# Patient Record
Sex: MALE | Race: WHITE | NOT HISPANIC OR LATINO | URBAN - METROPOLITAN AREA
[De-identification: names, ages, dates, MRNs, and addresses within clinical notes are randomized per-mention and may not be internally consistent; named-entity substitution may affect disease eponyms.]

---

## 2023-03-06 ENCOUNTER — HOSPITAL ENCOUNTER (INPATIENT)
Facility: MEDICAL CENTER | Age: 68
LOS: 2 days | DRG: 247 | End: 2023-03-08
Attending: EMERGENCY MEDICINE | Admitting: EMERGENCY MEDICINE
Payer: MEDICARE

## 2023-03-06 ENCOUNTER — APPOINTMENT (OUTPATIENT)
Dept: CARDIOLOGY | Facility: MEDICAL CENTER | Age: 68
DRG: 247 | End: 2023-03-06
Attending: EMERGENCY MEDICINE
Payer: MEDICARE

## 2023-03-06 ENCOUNTER — APPOINTMENT (OUTPATIENT)
Dept: RADIOLOGY | Facility: MEDICAL CENTER | Age: 68
DRG: 247 | End: 2023-03-06
Attending: EMERGENCY MEDICINE
Payer: MEDICARE

## 2023-03-06 ENCOUNTER — APPOINTMENT (OUTPATIENT)
Dept: RADIOLOGY | Facility: MEDICAL CENTER | Age: 68
DRG: 247 | End: 2023-03-06
Payer: MEDICARE

## 2023-03-06 ENCOUNTER — APPOINTMENT (OUTPATIENT)
Dept: CARDIOLOGY | Facility: MEDICAL CENTER | Age: 68
DRG: 247 | End: 2023-03-06
Attending: INTERNAL MEDICINE
Payer: MEDICARE

## 2023-03-06 DIAGNOSIS — E03.9 HYPOTHYROIDISM, UNSPECIFIED TYPE: ICD-10-CM

## 2023-03-06 DIAGNOSIS — I25.83 CORONARY ARTERY DISEASE DUE TO LIPID RICH PLAQUE: Chronic | ICD-10-CM

## 2023-03-06 DIAGNOSIS — I25.10 CORONARY ARTERY DISEASE DUE TO LIPID RICH PLAQUE: Chronic | ICD-10-CM

## 2023-03-06 DIAGNOSIS — I21.3 ST ELEVATION MYOCARDIAL INFARCTION (STEMI), UNSPECIFIED ARTERY (HCC): ICD-10-CM

## 2023-03-06 PROBLEM — M54.16 LUMBAR RADICULOPATHY: Status: ACTIVE | Noted: 2018-01-18

## 2023-03-06 PROBLEM — I47.20 V-TACH (HCC): Status: ACTIVE | Noted: 2023-03-05

## 2023-03-06 PROBLEM — M17.0 BILATERAL PRIMARY OSTEOARTHRITIS OF KNEE: Status: ACTIVE | Noted: 2018-02-13

## 2023-03-06 PROBLEM — G47.33 OBSTRUCTIVE SLEEP APNEA SYNDROME IN ADULT: Status: ACTIVE | Noted: 2018-09-06

## 2023-03-06 PROBLEM — E78.5 HYPERLIPIDEMIA: Status: ACTIVE | Noted: 2023-01-24

## 2023-03-06 PROBLEM — E87.29 HIGH ANION GAP METABOLIC ACIDOSIS: Status: ACTIVE | Noted: 2023-03-06

## 2023-03-06 PROBLEM — R97.20 ELEVATED PSA: Status: ACTIVE | Noted: 2021-10-06

## 2023-03-06 PROBLEM — I70.0 ATHEROSCLEROSIS OF AORTA (HCC): Status: ACTIVE | Noted: 2019-06-21

## 2023-03-06 LAB
ALBUMIN SERPL BCP-MCNC: 3.9 G/DL (ref 3.2–4.9)
ALBUMIN SERPL BCP-MCNC: 4.5 G/DL (ref 3.2–4.9)
ALBUMIN/GLOB SERPL: 1.6 G/DL
ALBUMIN/GLOB SERPL: 1.9 G/DL
ALP SERPL-CCNC: 59 U/L (ref 30–99)
ALP SERPL-CCNC: 69 U/L (ref 30–99)
ALT SERPL-CCNC: 41 U/L (ref 2–50)
ALT SERPL-CCNC: 69 U/L (ref 2–50)
ANION GAP SERPL CALC-SCNC: 12 MMOL/L (ref 7–16)
ANION GAP SERPL CALC-SCNC: 16 MMOL/L (ref 7–16)
ANION GAP SERPL CALC-SCNC: 16 MMOL/L (ref 7–16)
ANION GAP SERPL CALC-SCNC: 9 MMOL/L (ref 7–16)
APTT PPP: 53.7 SEC (ref 24.7–36)
AST SERPL-CCNC: 324 U/L (ref 12–45)
AST SERPL-CCNC: 79 U/L (ref 12–45)
B-OH-BUTYR SERPL-MCNC: 0.68 MMOL/L (ref 0.02–0.27)
BASOPHILS # BLD AUTO: 0.3 % (ref 0–1.8)
BASOPHILS # BLD: 0.05 K/UL (ref 0–0.12)
BILIRUB SERPL-MCNC: 1.5 MG/DL (ref 0.1–1.5)
BILIRUB SERPL-MCNC: 1.5 MG/DL (ref 0.1–1.5)
BUN SERPL-MCNC: 15 MG/DL (ref 8–22)
BUN SERPL-MCNC: 16 MG/DL (ref 8–22)
BUN SERPL-MCNC: 16 MG/DL (ref 8–22)
BUN SERPL-MCNC: 17 MG/DL (ref 8–22)
CALCIUM ALBUM COR SERPL-MCNC: 8.9 MG/DL (ref 8.5–10.5)
CALCIUM ALBUM COR SERPL-MCNC: 9 MG/DL (ref 8.5–10.5)
CALCIUM SERPL-MCNC: 8.9 MG/DL (ref 8.5–10.5)
CALCIUM SERPL-MCNC: 8.9 MG/DL (ref 8.5–10.5)
CALCIUM SERPL-MCNC: 9.1 MG/DL (ref 8.5–10.5)
CALCIUM SERPL-MCNC: 9.3 MG/DL (ref 8.5–10.5)
CHLORIDE SERPL-SCNC: 100 MMOL/L (ref 96–112)
CHLORIDE SERPL-SCNC: 102 MMOL/L (ref 96–112)
CHLORIDE SERPL-SCNC: 103 MMOL/L (ref 96–112)
CHLORIDE SERPL-SCNC: 104 MMOL/L (ref 96–112)
CO2 SERPL-SCNC: 16 MMOL/L (ref 20–33)
CO2 SERPL-SCNC: 16 MMOL/L (ref 20–33)
CO2 SERPL-SCNC: 19 MMOL/L (ref 20–33)
CO2 SERPL-SCNC: 22 MMOL/L (ref 20–33)
CREAT SERPL-MCNC: 0.58 MG/DL (ref 0.5–1.4)
CREAT SERPL-MCNC: 0.64 MG/DL (ref 0.5–1.4)
CREAT SERPL-MCNC: 0.65 MG/DL (ref 0.5–1.4)
CREAT SERPL-MCNC: 0.75 MG/DL (ref 0.5–1.4)
EKG IMPRESSION: NORMAL
EOSINOPHIL # BLD AUTO: 0 K/UL (ref 0–0.51)
EOSINOPHIL NFR BLD: 0 % (ref 0–6.9)
ERYTHROCYTE [DISTWIDTH] IN BLOOD BY AUTOMATED COUNT: 41.9 FL (ref 35.9–50)
EST. AVERAGE GLUCOSE BLD GHB EST-MCNC: 111 MG/DL
GFR SERPLBLD CREATININE-BSD FMLA CKD-EPI: 102 ML/MIN/1.73 M 2
GFR SERPLBLD CREATININE-BSD FMLA CKD-EPI: 103 ML/MIN/1.73 M 2
GFR SERPLBLD CREATININE-BSD FMLA CKD-EPI: 106 ML/MIN/1.73 M 2
GFR SERPLBLD CREATININE-BSD FMLA CKD-EPI: 98 ML/MIN/1.73 M 2
GLOBULIN SER CALC-MCNC: 2.1 G/DL (ref 1.9–3.5)
GLOBULIN SER CALC-MCNC: 2.9 G/DL (ref 1.9–3.5)
GLUCOSE BLD STRIP.AUTO-MCNC: 117 MG/DL (ref 65–99)
GLUCOSE BLD STRIP.AUTO-MCNC: 117 MG/DL (ref 65–99)
GLUCOSE BLD STRIP.AUTO-MCNC: 138 MG/DL (ref 65–99)
GLUCOSE BLD STRIP.AUTO-MCNC: 98 MG/DL (ref 65–99)
GLUCOSE SERPL-MCNC: 130 MG/DL (ref 65–99)
GLUCOSE SERPL-MCNC: 133 MG/DL (ref 65–99)
GLUCOSE SERPL-MCNC: 135 MG/DL (ref 65–99)
GLUCOSE SERPL-MCNC: 150 MG/DL (ref 65–99)
HBA1C MFR BLD: 5.5 % (ref 4–5.6)
HCT VFR BLD AUTO: 46 % (ref 42–52)
HGB BLD-MCNC: 15.7 G/DL (ref 14–18)
IMM GRANULOCYTES # BLD AUTO: 0.05 K/UL (ref 0–0.11)
IMM GRANULOCYTES NFR BLD AUTO: 0.3 % (ref 0–0.9)
INR PPP: 1.14 (ref 0.87–1.13)
LACTATE SERPL-SCNC: 1.4 MMOL/L (ref 0.5–2)
LV EJECT FRACT  99904: 45
LV EJECT FRACT MOD 2C 99903: 60.82
LV EJECT FRACT MOD 4C 99902: 58.77
LV EJECT FRACT MOD BP 99901: 60.15
LYMPHOCYTES # BLD AUTO: 1.54 K/UL (ref 1–4.8)
LYMPHOCYTES NFR BLD: 10.7 % (ref 22–41)
MAGNESIUM SERPL-MCNC: 2.1 MG/DL (ref 1.5–2.5)
MAGNESIUM SERPL-MCNC: 2.1 MG/DL (ref 1.5–2.5)
MCH RBC QN AUTO: 30.5 PG (ref 27–33)
MCHC RBC AUTO-ENTMCNC: 34.1 G/DL (ref 33.7–35.3)
MCV RBC AUTO: 89.5 FL (ref 81.4–97.8)
MONOCYTES # BLD AUTO: 0.58 K/UL (ref 0–0.85)
MONOCYTES NFR BLD AUTO: 4 % (ref 0–13.4)
NEUTROPHILS # BLD AUTO: 12.19 K/UL (ref 1.82–7.42)
NEUTROPHILS NFR BLD: 84.7 % (ref 44–72)
NRBC # BLD AUTO: 0 K/UL
NRBC BLD-RTO: 0 /100 WBC
PHOSPHATE SERPL-MCNC: 3.9 MG/DL (ref 2.5–4.5)
PLATELET # BLD AUTO: 231 K/UL (ref 164–446)
PMV BLD AUTO: 9.2 FL (ref 9–12.9)
POTASSIUM SERPL-SCNC: 3.9 MMOL/L (ref 3.6–5.5)
POTASSIUM SERPL-SCNC: 4.2 MMOL/L (ref 3.6–5.5)
POTASSIUM SERPL-SCNC: 4.3 MMOL/L (ref 3.6–5.5)
POTASSIUM SERPL-SCNC: 4.5 MMOL/L (ref 3.6–5.5)
PROT SERPL-MCNC: 6 G/DL (ref 6–8.2)
PROT SERPL-MCNC: 7.4 G/DL (ref 6–8.2)
PROTHROMBIN TIME: 14.5 SEC (ref 12–14.6)
RBC # BLD AUTO: 5.14 M/UL (ref 4.7–6.1)
SALICYLATES SERPL-MCNC: <1 MG/DL (ref 15–25)
SODIUM SERPL-SCNC: 132 MMOL/L (ref 135–145)
SODIUM SERPL-SCNC: 134 MMOL/L (ref 135–145)
SODIUM SERPL-SCNC: 134 MMOL/L (ref 135–145)
SODIUM SERPL-SCNC: 135 MMOL/L (ref 135–145)
TROPONIN T SERPL-MCNC: 3296 NG/L (ref 6–19)
TROPONIN T SERPL-MCNC: 687 NG/L (ref 6–19)
TSH SERPL DL<=0.005 MIU/L-ACNC: 5.08 UIU/ML (ref 0.38–5.33)
UFH PPP CHRO-ACNC: 0.15 IU/ML
UFH PPP CHRO-ACNC: 0.22 IU/ML
WBC # BLD AUTO: 14.4 K/UL (ref 4.8–10.8)

## 2023-03-06 PROCEDURE — 93005 ELECTROCARDIOGRAM TRACING: CPT | Performed by: INTERNAL MEDICINE

## 2023-03-06 PROCEDURE — 99291 CRITICAL CARE FIRST HOUR: CPT | Performed by: EMERGENCY MEDICINE

## 2023-03-06 PROCEDURE — 93010 ELECTROCARDIOGRAM REPORT: CPT | Mod: 59 | Performed by: INTERNAL MEDICINE

## 2023-03-06 PROCEDURE — 96365 THER/PROPH/DIAG IV INF INIT: CPT

## 2023-03-06 PROCEDURE — 027034Z DILATION OF CORONARY ARTERY, ONE ARTERY WITH DRUG-ELUTING INTRALUMINAL DEVICE, PERCUTANEOUS APPROACH: ICD-10-PCS | Performed by: INTERNAL MEDICINE

## 2023-03-06 PROCEDURE — 700102 HCHG RX REV CODE 250 W/ 637 OVERRIDE(OP): Performed by: INTERNAL MEDICINE

## 2023-03-06 PROCEDURE — 700111 HCHG RX REV CODE 636 W/ 250 OVERRIDE (IP): Performed by: INTERNAL MEDICINE

## 2023-03-06 PROCEDURE — A9270 NON-COVERED ITEM OR SERVICE: HCPCS | Performed by: INTERNAL MEDICINE

## 2023-03-06 PROCEDURE — 71045 X-RAY EXAM CHEST 1 VIEW: CPT

## 2023-03-06 PROCEDURE — 36415 COLL VENOUS BLD VENIPUNCTURE: CPT

## 2023-03-06 PROCEDURE — B2151ZZ FLUOROSCOPY OF LEFT HEART USING LOW OSMOLAR CONTRAST: ICD-10-PCS | Performed by: INTERNAL MEDICINE

## 2023-03-06 PROCEDURE — 93005 ELECTROCARDIOGRAM TRACING: CPT | Performed by: EMERGENCY MEDICINE

## 2023-03-06 PROCEDURE — 83735 ASSAY OF MAGNESIUM: CPT | Mod: 91

## 2023-03-06 PROCEDURE — 700102 HCHG RX REV CODE 250 W/ 637 OVERRIDE(OP): Performed by: EMERGENCY MEDICINE

## 2023-03-06 PROCEDURE — 93458 L HRT ARTERY/VENTRICLE ANGIO: CPT | Mod: 26,59 | Performed by: INTERNAL MEDICINE

## 2023-03-06 PROCEDURE — 80053 COMPREHEN METABOLIC PANEL: CPT | Mod: 91

## 2023-03-06 PROCEDURE — 770022 HCHG ROOM/CARE - ICU (200)

## 2023-03-06 PROCEDURE — 700105 HCHG RX REV CODE 258: Performed by: INTERNAL MEDICINE

## 2023-03-06 PROCEDURE — 93010 ELECTROCARDIOGRAM REPORT: CPT | Mod: 77,59 | Performed by: INTERNAL MEDICINE

## 2023-03-06 PROCEDURE — 92928 PRQ TCAT PLMT NTRAC ST 1 LES: CPT | Mod: RC | Performed by: INTERNAL MEDICINE

## 2023-03-06 PROCEDURE — 93306 TTE W/DOPPLER COMPLETE: CPT | Mod: 26 | Performed by: INTERNAL MEDICINE

## 2023-03-06 PROCEDURE — 83605 ASSAY OF LACTIC ACID: CPT

## 2023-03-06 PROCEDURE — 83036 HEMOGLOBIN GLYCOSYLATED A1C: CPT

## 2023-03-06 PROCEDURE — 82962 GLUCOSE BLOOD TEST: CPT | Mod: 91

## 2023-03-06 PROCEDURE — 85610 PROTHROMBIN TIME: CPT

## 2023-03-06 PROCEDURE — A9270 NON-COVERED ITEM OR SERVICE: HCPCS | Performed by: EMERGENCY MEDICINE

## 2023-03-06 PROCEDURE — 700111 HCHG RX REV CODE 636 W/ 250 OVERRIDE (IP): Performed by: EMERGENCY MEDICINE

## 2023-03-06 PROCEDURE — 700101 HCHG RX REV CODE 250

## 2023-03-06 PROCEDURE — 85025 COMPLETE CBC W/AUTO DIFF WBC: CPT

## 2023-03-06 PROCEDURE — 84100 ASSAY OF PHOSPHORUS: CPT

## 2023-03-06 PROCEDURE — 99223 1ST HOSP IP/OBS HIGH 75: CPT | Mod: 25 | Performed by: INTERNAL MEDICINE

## 2023-03-06 PROCEDURE — 93306 TTE W/DOPPLER COMPLETE: CPT

## 2023-03-06 PROCEDURE — C1887 CATHETER, GUIDING: HCPCS

## 2023-03-06 PROCEDURE — 700111 HCHG RX REV CODE 636 W/ 250 OVERRIDE (IP)

## 2023-03-06 PROCEDURE — 93005 ELECTROCARDIOGRAM TRACING: CPT

## 2023-03-06 PROCEDURE — B2111ZZ FLUOROSCOPY OF MULTIPLE CORONARY ARTERIES USING LOW OSMOLAR CONTRAST: ICD-10-PCS | Performed by: INTERNAL MEDICINE

## 2023-03-06 PROCEDURE — 700105 HCHG RX REV CODE 258: Performed by: EMERGENCY MEDICINE

## 2023-03-06 PROCEDURE — 82010 KETONE BODYS QUAN: CPT

## 2023-03-06 PROCEDURE — 4A023N7 MEASUREMENT OF CARDIAC SAMPLING AND PRESSURE, LEFT HEART, PERCUTANEOUS APPROACH: ICD-10-PCS | Performed by: INTERNAL MEDICINE

## 2023-03-06 PROCEDURE — 94760 N-INVAS EAR/PLS OXIMETRY 1: CPT

## 2023-03-06 PROCEDURE — 80048 BASIC METABOLIC PNL TOTAL CA: CPT | Mod: 91

## 2023-03-06 PROCEDURE — 84484 ASSAY OF TROPONIN QUANT: CPT | Mod: 91

## 2023-03-06 PROCEDURE — 700117 HCHG RX CONTRAST REV CODE 255: Performed by: INTERNAL MEDICINE

## 2023-03-06 PROCEDURE — 85730 THROMBOPLASTIN TIME PARTIAL: CPT

## 2023-03-06 PROCEDURE — 80179 DRUG ASSAY SALICYLATE: CPT

## 2023-03-06 PROCEDURE — 85520 HEPARIN ASSAY: CPT | Mod: 91

## 2023-03-06 PROCEDURE — 700117 HCHG RX CONTRAST REV CODE 255: Performed by: EMERGENCY MEDICINE

## 2023-03-06 PROCEDURE — 84443 ASSAY THYROID STIM HORMONE: CPT

## 2023-03-06 PROCEDURE — 99291 CRITICAL CARE FIRST HOUR: CPT

## 2023-03-06 PROCEDURE — 99152 MOD SED SAME PHYS/QHP 5/>YRS: CPT | Performed by: INTERNAL MEDICINE

## 2023-03-06 RX ORDER — BISACODYL 10 MG
10 SUPPOSITORY, RECTAL RECTAL
Status: DISCONTINUED | OUTPATIENT
Start: 2023-03-06 | End: 2023-03-08 | Stop reason: HOSPADM

## 2023-03-06 RX ORDER — HEPARIN SODIUM 1000 [USP'U]/ML
INJECTION, SOLUTION INTRAVENOUS; SUBCUTANEOUS
Status: COMPLETED
Start: 2023-03-06 | End: 2023-03-06

## 2023-03-06 RX ORDER — HEPARIN SODIUM 200 [USP'U]/100ML
INJECTION, SOLUTION INTRAVENOUS
Status: COMPLETED
Start: 2023-03-06 | End: 2023-03-06

## 2023-03-06 RX ORDER — HEPARIN SODIUM 5000 [USP'U]/100ML
0-30 INJECTION, SOLUTION INTRAVENOUS CONTINUOUS
Status: DISCONTINUED | OUTPATIENT
Start: 2023-03-06 | End: 2023-03-06

## 2023-03-06 RX ORDER — MIDAZOLAM HYDROCHLORIDE 1 MG/ML
INJECTION INTRAMUSCULAR; INTRAVENOUS
Status: COMPLETED
Start: 2023-03-06 | End: 2023-03-06

## 2023-03-06 RX ORDER — AMOXICILLIN 250 MG
2 CAPSULE ORAL 2 TIMES DAILY
Status: DISCONTINUED | OUTPATIENT
Start: 2023-03-06 | End: 2023-03-08 | Stop reason: HOSPADM

## 2023-03-06 RX ORDER — ENOXAPARIN SODIUM 100 MG/ML
40 INJECTION SUBCUTANEOUS DAILY
Status: DISCONTINUED | OUTPATIENT
Start: 2023-03-06 | End: 2023-03-08 | Stop reason: HOSPADM

## 2023-03-06 RX ORDER — HYDRALAZINE HYDROCHLORIDE 20 MG/ML
10 INJECTION INTRAMUSCULAR; INTRAVENOUS EVERY 4 HOURS PRN
Status: DISCONTINUED | OUTPATIENT
Start: 2023-03-06 | End: 2023-03-08 | Stop reason: HOSPADM

## 2023-03-06 RX ORDER — CLOPIDOGREL BISULFATE 75 MG/1
75 TABLET ORAL DAILY
Status: DISCONTINUED | OUTPATIENT
Start: 2023-03-06 | End: 2023-03-08 | Stop reason: HOSPADM

## 2023-03-06 RX ORDER — VERAPAMIL HYDROCHLORIDE 2.5 MG/ML
INJECTION, SOLUTION INTRAVENOUS
Status: COMPLETED
Start: 2023-03-06 | End: 2023-03-06

## 2023-03-06 RX ORDER — LIDOCAINE HYDROCHLORIDE 20 MG/ML
INJECTION, SOLUTION INFILTRATION; PERINEURAL
Status: COMPLETED
Start: 2023-03-06 | End: 2023-03-06

## 2023-03-06 RX ORDER — SODIUM CHLORIDE, SODIUM LACTATE, POTASSIUM CHLORIDE, AND CALCIUM CHLORIDE .6; .31; .03; .02 G/100ML; G/100ML; G/100ML; G/100ML
250 INJECTION, SOLUTION INTRAVENOUS ONCE
Status: COMPLETED | OUTPATIENT
Start: 2023-03-06 | End: 2023-03-06

## 2023-03-06 RX ORDER — POLYETHYLENE GLYCOL 3350 17 G/17G
1 POWDER, FOR SOLUTION ORAL
Status: DISCONTINUED | OUTPATIENT
Start: 2023-03-06 | End: 2023-03-08 | Stop reason: HOSPADM

## 2023-03-06 RX ORDER — HEPARIN SODIUM 1000 [USP'U]/ML
2000 INJECTION, SOLUTION INTRAVENOUS; SUBCUTANEOUS PRN
Status: DISCONTINUED | OUTPATIENT
Start: 2023-03-06 | End: 2023-03-06

## 2023-03-06 RX ORDER — ATORVASTATIN CALCIUM 80 MG/1
80 TABLET, FILM COATED ORAL EVERY EVENING
Status: DISCONTINUED | OUTPATIENT
Start: 2023-03-06 | End: 2023-03-08 | Stop reason: HOSPADM

## 2023-03-06 RX ORDER — LEVOTHYROXINE SODIUM 0.07 MG/1
75 TABLET ORAL
Status: DISCONTINUED | OUTPATIENT
Start: 2023-03-06 | End: 2023-03-08 | Stop reason: HOSPADM

## 2023-03-06 RX ORDER — BIVALIRUDIN 250 MG/5ML
INJECTION, POWDER, LYOPHILIZED, FOR SOLUTION INTRAVENOUS
Status: COMPLETED
Start: 2023-03-06 | End: 2023-03-06

## 2023-03-06 RX ORDER — GINSENG 100 MG
50 CAPSULE ORAL DAILY
COMMUNITY

## 2023-03-06 RX ORDER — ACETAMINOPHEN 325 MG/1
650 TABLET ORAL EVERY 6 HOURS PRN
Status: DISCONTINUED | OUTPATIENT
Start: 2023-03-06 | End: 2023-03-08 | Stop reason: HOSPADM

## 2023-03-06 RX ORDER — SODIUM CHLORIDE 9 MG/ML
INJECTION, SOLUTION INTRAVENOUS CONTINUOUS
Status: ACTIVE | OUTPATIENT
Start: 2023-03-06 | End: 2023-03-06

## 2023-03-06 RX ADMIN — ATORVASTATIN CALCIUM 80 MG: 80 TABLET, FILM COATED ORAL at 17:06

## 2023-03-06 RX ADMIN — LIDOCAINE HYDROCHLORIDE: 20 INJECTION, SOLUTION INFILTRATION; PERINEURAL at 08:01

## 2023-03-06 RX ADMIN — CLOPIDOGREL BISULFATE 75 MG: 75 TABLET ORAL at 06:41

## 2023-03-06 RX ADMIN — FENTANYL CITRATE 100 MCG: 50 INJECTION, SOLUTION INTRAMUSCULAR; INTRAVENOUS at 08:21

## 2023-03-06 RX ADMIN — BIVALIRUDIN 1.75 MG/KG/HR: 250 INJECTION, POWDER, LYOPHILIZED, FOR SOLUTION INTRAVENOUS at 08:34

## 2023-03-06 RX ADMIN — HUMAN ALBUMIN MICROSPHERES AND PERFLUTREN 3 ML: 10; .22 INJECTION, SOLUTION INTRAVENOUS at 14:20

## 2023-03-06 RX ADMIN — METOPROLOL TARTRATE 12.5 MG: 25 TABLET, FILM COATED ORAL at 09:10

## 2023-03-06 RX ADMIN — HEPARIN SODIUM 11 UNITS/KG/HR: 5000 INJECTION, SOLUTION INTRAVENOUS at 00:49

## 2023-03-06 RX ADMIN — ASPIRIN 81 MG: 81 TABLET, COATED ORAL at 09:10

## 2023-03-06 RX ADMIN — SODIUM CHLORIDE, POTASSIUM CHLORIDE, SODIUM LACTATE AND CALCIUM CHLORIDE 250 ML: 600; 310; 30; 20 INJECTION, SOLUTION INTRAVENOUS at 02:46

## 2023-03-06 RX ADMIN — SENNOSIDES AND DOCUSATE SODIUM 2 TABLET: 50; 8.6 TABLET ORAL at 17:06

## 2023-03-06 RX ADMIN — NITROGLYCERIN 10 ML: 20 INJECTION INTRAVENOUS at 08:01

## 2023-03-06 RX ADMIN — THIAMINE HYDROCHLORIDE 100 MG: 100 INJECTION, SOLUTION INTRAMUSCULAR; INTRAVENOUS at 02:49

## 2023-03-06 RX ADMIN — SODIUM BICARBONATE 100 MEQ: 84 INJECTION, SOLUTION INTRAVENOUS at 06:41

## 2023-03-06 RX ADMIN — VERAPAMIL HYDROCHLORIDE 2.5 MG: 2.5 INJECTION, SOLUTION INTRAVENOUS at 08:01

## 2023-03-06 RX ADMIN — HEPARIN SODIUM 2000 UNITS: 200 INJECTION, SOLUTION INTRAVENOUS at 08:01

## 2023-03-06 RX ADMIN — IOHEXOL 75 ML: 350 INJECTION, SOLUTION INTRAVENOUS at 08:36

## 2023-03-06 RX ADMIN — METOPROLOL TARTRATE 12.5 MG: 25 TABLET, FILM COATED ORAL at 17:06

## 2023-03-06 RX ADMIN — SODIUM CHLORIDE: 9 INJECTION, SOLUTION INTRAVENOUS at 09:30

## 2023-03-06 RX ADMIN — MIDAZOLAM HYDROCHLORIDE 2 MG: 1 INJECTION, SOLUTION INTRAMUSCULAR; INTRAVENOUS at 08:21

## 2023-03-06 RX ADMIN — BIVALIRUDIN 0.2 MG/KG/HR: 250 INJECTION, POWDER, LYOPHILIZED, FOR SOLUTION INTRAVENOUS at 08:40

## 2023-03-06 RX ADMIN — ENOXAPARIN SODIUM 40 MG: 40 INJECTION SUBCUTANEOUS at 17:06

## 2023-03-06 RX ADMIN — LEVOTHYROXINE SODIUM 75 MCG: 0.07 TABLET ORAL at 06:40

## 2023-03-06 RX ADMIN — HEPARIN SODIUM: 1000 INJECTION, SOLUTION INTRAVENOUS; SUBCUTANEOUS at 08:01

## 2023-03-06 ASSESSMENT — LIFESTYLE VARIABLES
TOTAL SCORE: 0
AVERAGE NUMBER OF DAYS PER WEEK YOU HAVE A DRINK CONTAINING ALCOHOL: 4
ON A TYPICAL DAY WHEN YOU DRINK ALCOHOL HOW MANY DRINKS DO YOU HAVE: 2
TOTAL SCORE: 0
HAVE PEOPLE ANNOYED YOU BY CRITICIZING YOUR DRINKING: NO
SUBSTANCE_ABUSE: 0
CONSUMPTION TOTAL: NEGATIVE
HAVE YOU EVER FELT YOU SHOULD CUT DOWN ON YOUR DRINKING: NO
EVER FELT BAD OR GUILTY ABOUT YOUR DRINKING: NO
DOES PATIENT WANT TO STOP DRINKING: NO
TOTAL SCORE: 0
HOW MANY TIMES IN THE PAST YEAR HAVE YOU HAD 5 OR MORE DRINKS IN A DAY: 0
EVER HAD A DRINK FIRST THING IN THE MORNING TO STEADY YOUR NERVES TO GET RID OF A HANGOVER: NO
ALCOHOL_USE: NO

## 2023-03-06 ASSESSMENT — ENCOUNTER SYMPTOMS
FEVER: 0
CHILLS: 0
SENSORY CHANGE: 0
HEARTBURN: 0
NAUSEA: 0
SPUTUM PRODUCTION: 0
SINUS PAIN: 0
BRUISES/BLEEDS EASILY: 0
FOCAL WEAKNESS: 0
ABDOMINAL PAIN: 0
SPEECH CHANGE: 0
NECK PAIN: 0
HALLUCINATIONS: 0
WEIGHT LOSS: 0
PHOTOPHOBIA: 0
SHORTNESS OF BREATH: 0
HEMOPTYSIS: 0
DOUBLE VISION: 0
ORTHOPNEA: 0
HEADACHES: 0
COUGH: 0
BLOOD IN STOOL: 0
MYALGIAS: 0
TREMORS: 0
DIZZINESS: 0
VOMITING: 0
PALPITATIONS: 0
SORE THROAT: 0
BLURRED VISION: 0
DEPRESSION: 0
TINGLING: 0

## 2023-03-06 ASSESSMENT — FIBROSIS 4 INDEX
FIB4 SCORE: 3.63
FIB4 SCORE: 1.94

## 2023-03-06 ASSESSMENT — PAIN DESCRIPTION - PAIN TYPE
TYPE: ACUTE PAIN

## 2023-03-06 ASSESSMENT — PATIENT HEALTH QUESTIONNAIRE - PHQ9
2. FEELING DOWN, DEPRESSED, IRRITABLE, OR HOPELESS: NOT AT ALL
1. LITTLE INTEREST OR PLEASURE IN DOING THINGS: NOT AT ALL
SUM OF ALL RESPONSES TO PHQ9 QUESTIONS 1 AND 2: 0

## 2023-03-06 NOTE — DISCHARGE PLANNING
Medical Social Work    Referral: STEMI Transfer    Intervention: Pt is a 67 year old male brought in by [facility] for STEMI.  Pt is Albert Fu (: 1955).  Per flight crew [place] as soon as they can.  Per chart pt's emergency contacts are: wife Renetta ([phone]) and son Khari ([phone]).  Pt not going to cath lab at this time per Cardiology.      Plan: SW will follow as needed.

## 2023-03-06 NOTE — PROGRESS NOTES
Code STEMI activation. Door time 0035     Transfer from [facility] with shoveling started 3/5 @1600, patient had some pVT at outlying facility     Outside facility medications:  324 ASA   5000 units heparin IV 3/5 2154  Heparin drip initiated @ 8.3 units/kg/hour  50 mg TNK 3/5 2232  500 mL NS bolus    No neurologic deficits noted, LINDA singh, A&Ox4      0053- STEMI cancelled by cardiology team due to improved EKG post TNK administration. Patient to Red 4.

## 2023-03-06 NOTE — PROCEDURES
Cardiac Catheterization report    3/6/2023  8:45 AM    REFERRING MD: Dr. Ruiz    INDICATION/PREOPERATIVE DIAGNOSIS:   ST elevation myocardial infarction status post successful TNK    POSTOPERATIVE DIAGNOSIS:  70% hazy culprit mid RCA disease   nondominant LCx with high-grade OM1 disease  Diffuse high-grade proximal and mid LAD disease up to 80% stenosis.  Large distal vessel.  LVEF 60%, LVEDP 11 mmHg  Successful PCI culprit RCA (3.75 x 20 mm Synergy BAN), excellent result    RECOMMENDATIONS:  Guideline directed medical therapy   Cardiovascular Risk factor modification  Dual antiplatelet therapy for minimum 1 year  Staged revascularization of LAD plus minus OM.  Recommend consideration of CABG due to diffuse nature of the proximal and mid LAD disease.  PCI would be reasonable however the  of the LCx is moderately unfavorable for PCI.      PROCEDURES PERFORMED:  Coronary arteriograms  Left heart catheterization and Left ventriculogram   Supervision moderate sedation  Percutaneous coronary intervention      FINDINGS:  I.  HEMODYNAMICS   Ao: 74/51 mmHg   LEDP: 11 mmHg   Gradient on LV pullback: No    II. CORONARY ANGIOGRAPHY:  Left main coronary artery: Moderate caliber bifurcating mild nonobstructive CAD  Left anterior descending artery: Large to moderate caliber wrapping around the apex there is a diffuse segment of 50% stenosis in the proximal portion followed by a midportion of 70% stenosis and a tandem 70% stenosis.  Becomes a large vessel distally.  All diagonals are small.  Left circumflex coronary artery: Moderate caliber nondominant diffuse OM1 disease subsequent to the OM1 the LCx becomes chronic totally occluded and receives brisk ipsilateral collaterals.  Right coronary artery: Large-caliber dominant with a hazy 70% midportion stenosis postthrombolytic therapy placed most likely to be culprit given the clinical situation.  Post PCI there is 0% residual stenosis and MILADYS-3 flow.    III.  LEFT  "VENTRICULOGRAM:  LVEF WHITE PROJECTION: 60%      Procedure details:  The risks and benefits of cardiac catheterization and possible intervention were explained to the patient including death, heart attack, stroke, and emergency surgery.  The patient was brought to the cardiac catheterization lab where the right wrist was prepped and draped in the usual manner for cardiac catheterization.  The area was anesthetized with lidocaine and a 5/6 FR sheath was inserted into the right radial artery without difficulty. A Jasen catheter was advanced to the ostia of the Left coronary artery and arteriograms were recorded.   A Jasen catheter was advanced to the ostia of the right coronary artery and arteriograms were recorded. Aortic valve was crossed using Jasen catheter for left heart catheterization was performed.     Description of PCI:  The decision was made to intervene on the culprit coronary artery.  Anticoagulation was initiated as below.  The diagnostic catheter was exchanged over a wire for an 6 Malian JR4 guide seated appropriately. A 0.014\" mm  BMW was advanced into the artery and use to cross the lesion. A 3.0 mm balloon was used to predilate the lesion. A 3.5 x 20 mm Synergy BAN was then positioned and deployed at nominal pressure. Following this a 3.75 mm noncompliant balloon to 20 nanda was used to post dilate the stent. There was an excellent angiographic result with MILADYS-3 flow and no residual stenosis in the stented segment. All catheters and guidewires were removed and the patient left the cath lab in stable condition.    At the completion of the case the sheath was removed and hemostasis achieved utilizing a radial compression band patient with Cath Lab in stable condition and there were no apparent complications.    Anticoagulant: Angiomax  Antiplatelet: Plavix (clopidogrel)  EBL <25 cc  Complications: none  Specimens: none  Contrast: 70 cc    Complications:  None apparent    Sedation time:  Moderate sedation " directly monitored by me during the case while supervising the administration of the sedation medication by an independent trained RN to assist in the monitoring of the patient's level of consciousness and physiological status. I, the supervising physician was present the entire time from beginning of medication administration until the end of the procedure from 8:20 AM until 8:37 AM. For detailed administration records please see the moderate sedation documentation in the media tab.    Following the procedure I discussed the results with the patient and the patients designated contact/family member following the procedure.    Messi Del Castillo MD, FACC, [facility]

## 2023-03-06 NOTE — ASSESSMENT & PLAN NOTE
- Acute onset chest pain on 3/5/23 after shoveling snow. Seen at [facility] in [facility]    Right and Inferior STEMI he was given TNKase at 22:30 pm and transferred to [facility]  - 3/6: PCI  BAN to RCA  - 70 % mid RCA disease.  - Diffused high grade proximal and mid LAD disease 80% stenosis.  - LVEF 60%, LVEDP 11 mmHg   - BAN to RCA  3/7: No events  overnight, asymptomatic, no complaints        Hemodynamically stable, unremarkable labs.        Needs ACE I to his medical regimen prior discharge         Plan:  - Guided medical therapy  - DAPT, BB, ACE I, Statin  - CTM  - Cardiothoracic surgery evaluation (inpatient or outpatient)  - Will need staged CABD for left hear disease  - PCP follow up

## 2023-03-06 NOTE — PROGRESS NOTES
Brief Cardiology Note:     I was called to discuss this patients care with Dr. Burgos at [facility] and with Apryl York and Sharif. We discussed patient presenting with inferior STEMI s/p TNKase at outside hospital due to transfer time >120 minutes.  Upon presentation, initial EKG showed sinus rhythm with PACs and minimal ST elevation.  Soon after, patient had wide complex tachycardia with EKG representing AIVR.  Patient is chest pain free and hemodynamically stable.    Continue IV heparin.  Received IV heparin bolus and 300 mg of plavix at outside facility.  Plan for coronary angiogram this morning.  Please keep the patient npo.        Electronically Signed by:    David Hyde M.D.

## 2023-03-06 NOTE — PROGRESS NOTES
4 Eyes Skin Assessment Completed by Shandra RN and GALDINO Kim.    Head WDL  Ears WDL  Nose WDL  Mouth WDL  Neck WDL  Breast/Chest WDL  Shoulder Blades WDL  Spine WDL  (R) Arm/Elbow/Hand WDL  (L) Arm/Elbow/Hand WDL  Abdomen WDL  Groin WDL  Scrotum/Coccyx/Buttocks Redness and Blanching  (R) Leg WDL  (L) Leg WDL  (R) Heel/Foot/Toe WDL  (L) Heel/Foot/Toe WDL          Devices In Places ECG, Blood Pressure Cuff, Pulse Ox, and Nasal Cannula      Interventions In Place Gray Ear Foams, Pillows, and Low Air Loss Mattress    Possible Skin Injury No    Pictures Uploaded Into Epic N/A  Wound Consult Placed N/A  RN Wound Prevention Protocol Ordered No

## 2023-03-06 NOTE — PROGRESS NOTES
[facility] ICU Progress Note      Admit Date: 3/6/2023    Resident(s): Evan Palacios M.D.   Attending:  MAYURI ORTIZ/ Dr. Pooja MD    Patient ID:    Name:  Albert Fu   YOB: 1955  Age:  67 y.o.  male   MRN:  [id number]    Hospital Course (carried forward and updated):  Albert Fu is a 67 y.o. male with known past medical history Hypothyroidism and dyslipidemia. He was seen yesterday evening at [facility] for acute onset chest pain after exerting himself shoveling snow. EKG showed Inferior and posterior STEMI. He was given TNKase at around 22:30 pm and then transferred to [facility] for definitive management.  Patient is on Heparin drip and DAPT, expected to undergo PCI this morning by Dr. Del Castillo.  This morning patient alert and oriented times 4, no distress, denied SOB or chest pain. Vital signs stable.    Consultants:  Critical Care  Cardiology  Interventionist Cardiologist     Interval Events:  No acute events.  Alert and oriented times 4  Asymptomatic, denied chest pain, palpitations or SOB.  Stable vital signs.  Cath lab:   - 70 % mid RCA disease.  - Diffused high grade proximal and mid LAD disease 80% stenosis.  - LVEF 60%, LVEDP 11 mmHg   - BAN to RCA  Recommended consideration of CABG.  Guideline directed medical therapy DAPT, High intensity statin, BB and ACE inhibitor.  Start Lovenox for DVT prophylaxis this evening.    Vitals Range last 24h:  Temp:  [36.3 °C (97.4 °F)-36.7 °C (98 °F)] 36.5 °C (97.7 °F)  Pulse:  [49-90] 58  Resp:  [15-34] 20  BP: (105-135)/(58-75) 105/63  SpO2:  [91 %-97 %] 95 %      Intake/Output Summary (Last 24 hours) at 3/6/2023 1004  Last data filed at 3/6/2023 0800  Gross per 24 hour   Intake 100 ml   Output 250 ml   Net -150 ml        Review of Systems   Constitutional:  Negative for chills, fever, malaise/fatigue and weight loss.   HENT:  Negative for congestion, ear pain, sinus pain, sore throat and tinnitus.    Eyes:  Negative for blurred vision,  double vision and photophobia.   Respiratory:  Negative for cough, hemoptysis, sputum production and shortness of breath.    Cardiovascular:  Negative for chest pain, palpitations, orthopnea and leg swelling.   Gastrointestinal:  Negative for abdominal pain, blood in stool, heartburn, melena, nausea and vomiting.   Genitourinary:  Negative for dysuria, frequency, hematuria and urgency.   Musculoskeletal:  Negative for joint pain, myalgias and neck pain.   Skin:  Negative for rash.   Neurological:  Negative for dizziness, tingling, tremors, sensory change, speech change, focal weakness and headaches.   Endo/Heme/Allergies:  Does not bruise/bleed easily.   Psychiatric/Behavioral:  Negative for depression, hallucinations, substance abuse and suicidal ideas.       PHYSICAL EXAM:  Vitals:    03/06/23 0800 03/06/23 0915 03/06/23 0930 03/06/23 0945   BP: 116/75 128/71 119/66 105/63   Pulse: 62 65 64 (!) 58   Resp: (!) 34 (!) 26 17 20   Temp:       TempSrc:       SpO2: 95% 95% 95% 95%   Weight:       Height:        Body mass index is 31.67 kg/m².    O2 therapy: Pulse Oximetry: 95 %, O2 Delivery Device: None - Room Air    Date 03/06/23 0700 - 03/07/23 0659   Shift 8155-5728 2307-6839 3353-4598 24 Hour Total   INTAKE   Shift Total       OUTPUT   Urine 250   250     Number of Times Voided 1 x   1 x     Urine Void (mL) 250   250   Shift Total 250   250   NET -250   -250        Physical Exam  Vitals reviewed.   Constitutional:       General: He is not in acute distress.     Appearance: Normal appearance.   HENT:      Head: Normocephalic and atraumatic.      Nose: Nose normal. No congestion or rhinorrhea.      Mouth/Throat:      Mouth: Mucous membranes are moist.      Pharynx: Oropharynx is clear. No oropharyngeal exudate or posterior oropharyngeal erythema.   Eyes:      General: No scleral icterus.     Extraocular Movements: Extraocular movements intact.      Conjunctiva/sclera: Conjunctivae normal.      Pupils: Pupils are  equal, round, and reactive to light.   Cardiovascular:      Rate and Rhythm: Normal rate and regular rhythm.      Pulses: Normal pulses.      Heart sounds: Normal heart sounds. No murmur heard.  Pulmonary:      Effort: Pulmonary effort is normal. No respiratory distress.      Breath sounds: Normal breath sounds. No wheezing or rhonchi.   Abdominal:      General: Bowel sounds are normal. There is no distension.      Palpations: Abdomen is soft.      Tenderness: There is no abdominal tenderness. There is no guarding or rebound.   Musculoskeletal:         General: No swelling or deformity. Normal range of motion.      Cervical back: Normal range of motion and neck supple. No rigidity or tenderness.      Right lower leg: No edema.      Left lower leg: No edema.   Skin:     General: Skin is warm.      Capillary Refill: Capillary refill takes less than 2 seconds.      Coloration: Skin is not jaundiced.      Findings: No bruising or erythema.   Neurological:      General: No focal deficit present.      Mental Status: He is alert and oriented to person, place, and time.      Cranial Nerves: No cranial nerve deficit.      Sensory: No sensory deficit.      Motor: No weakness.   Psychiatric:         Mood and Affect: Mood normal.         Behavior: Behavior normal.         Thought Content: Thought content normal.           Recent Labs     03/06/23 0041 03/06/23  0230 03/06/23  0520   SODIUM 132* 134* 134*   POTASSIUM 4.5 4.2 4.3   CHLORIDE 100 102 103   CO2 16* 16* 19*   BUN 17 16 15   CREATININE 0.75 0.65 0.58   MAGNESIUM  --  2.1 2.1   PHOSPHORUS  --   --  3.9   CALCIUM 9.3 9.1 8.9     Recent Labs     03/05/23 2145 03/06/23 0041 03/06/23  0230 03/06/23  0520   ALTSGPT 47 41  --   --    ASTSGOT 51* 79*  --   --    ALKPHOSPHAT 89 69  --   --    TBILIRUBIN 1.9* 1.5  --   --    GLUCOSE 135* 150* 135* 130*     Recent Labs     03/05/23 2145 03/05/23  2150 03/06/23  0041   RBC 5.52  --  5.14   HEMOGLOBIN 16.9  --  15.7    HEMATOCRIT 49.3  --  46.0   PLATELETCT 261  --  231   PROTHROMBTM  --  10.7 14.5   APTT  --  25.9 53.7*   INR  --  1.00 1.14*     Recent Labs     03/05/23  2145 03/06/23  0041   WBC 16.5* 14.4*   NEUTSPOLYS  --  84.70*   LYMPHOCYTES  --  10.70*   MONOCYTES  --  4.00   EOSINOPHILS  --  0.00   BASOPHILS  --  0.30   ASTSGOT 51* 79*   ALTSGPT 47 41   ALKPHOSPHAT 89 69   TBILIRUBIN 1.9* 1.5       Meds:   clopidogrel  75 mg      atorvastatin  80 mg      senna-docusate  2 Tablet      And    polyethylene glycol/lytes  1 Packet      And    magnesium hydroxide  30 mL      And    bisacodyl  10 mg      hydrALAZINE  10 mg      insulin regular  1-6 Units      And    dextrose bolus  25 g      levothyroxine  75 mcg      [START ON 3/7/2023] thiamine  100 mg      sodium bicarbonate        NS   125 mL/hr at 03/06/23 0930    acetaminophen  650 mg      bivalirudin (ANGIOMAX) infusion  0.2 mg/kg/hr Stopped (03/06/23 0930)    aspirin EC  81 mg      metoprolol tartrate  12.5 mg      enoxaparin (LOVENOX) injection  40 mg          Procedures:  PCI    Imaging:  DX-CHEST-PORTABLE (1 VIEW)   Final Result         1.  Left basilar atelectasis, no focal infiltrate      EC-ECHOCARDIOGRAM COMPLETE W/O CONT    (Results Pending)   CL-LEFT HEART CATHETERIZATION WITH POSSIBLE INTERVENTION    (Results Pending)       ASSESSEMENT and PLAN:    * STEMI (ST elevation myocardial infarction) ([facility])- (present on admission)  Assessment & Plan  - Acute onset chest pain on 3/5/23 after shoveling snow. Seen at [facility] in [facility] [place]    Right and Inferior STEMI he was given TNKase at 22:30 pm and transferred to [facility]  - 3/6: PCI  BAN to RCA  - 70 % mid RCA disease.  - Diffused high grade proximal and mid LAD disease 80% stenosis.  - LVEF 60%, LVEDP 11 mmHg   - BAN to RCA         Plan:  - Guided medical therapy  - DAPT, BB, ACE I, Statin  - CTM  - Cardiothoracic surgery evaluation (inpatient or outpatient)      High anion gap metabolic acidosis  Assessment &  Plan  - On presentation Patient with a bicarbonate of 16, elevated anion gap blood sugar 150, no history of diabetes we will check BHB  Received a fluid bolus on route, awake alert well-perfused warm without hypotension, unlikely representative of shock  - Got a 250 cc LR bolus   - Improving Bic now 19  - CTM    Hypothyroid  Assessment & Plan  - Hx of Hypothyroidism   - On Levothyroxin  - TSH 5.08  - Continue home therapy        DISPO: ICU    CODE STATUS: Full Code    Quality Measures:  Feeding: Oral diet  Analgesia: n/a  Sedation: n/a  Thromboprophylaxis: Heparin ggt  Head of bed: >30 degrees  Ulcer prophylaxis: n/a  Glycemic control: Correctional: n/a / Basal: n/a  Bowel care: bowel regimen: Per protocol  Indwelling lines: PIV  Deescalation of antibiotics: N/A      Evan Palacios M.D.

## 2023-03-06 NOTE — CARE PLAN
The patient is Watcher - Medium risk of patient condition declining or worsening    Shift Goals Cath lab  Clinical Goals: hemodynamic stability    Progress made toward(s) clinical / shift goals:      Problem: Knowledge Deficit - Standard  Goal: Patient and family/care givers will demonstrate understanding of plan of care, disease process/condition, diagnostic tests and medications  Outcome: Progressing     Problem: Skin Integrity  Goal: Skin integrity is maintained or improved  Outcome: Progressing     Problem: Pain  Goal: Alleviation of Pain or a reduction in pain to the patient's comfort goal  Outcome: Progressing

## 2023-03-06 NOTE — ED NOTES
Report given to the floor nurse.   Patient awake alert AAO4.   Breathing spontaneously on 2L O2 via NC.

## 2023-03-06 NOTE — CARE PLAN
The patient is Watcher - Medium risk of patient condition declining or worsening    Shift Goals  Clinical Goals: hemodynamic stability    Progress made toward(s) clinical / shift goals:    Problem: Knowledge Deficit - Standard  Goal: Patient and family/care givers will demonstrate understanding of plan of care, disease process/condition, diagnostic tests and medications  Outcome: Progressing     Problem: Skin Integrity  Goal: Skin integrity is maintained or improved  Outcome: Progressing     Problem: Pain  Goal: Alleviation of Pain or a reduction in pain to the patient's comfort goal  Outcome: Progressing

## 2023-03-06 NOTE — CONSULTS
Reason for Consult:  Asked by Dr Joe Patton M.D. and Massimo Burgos to see this patient with  inferior STEMI  Patient's PCP: Pcp Not In Computer    CC:   Chief Complaint   Patient presents with    Chest Pain     STEMI transfer from [place]       HPI:  68 yo with HLD present to OSH with inferior STEMI present to [place] EKG showed inferior STEMI given lytics improved CP and ST segments had AIVR overnight.  NO prior CP was shoveling snow at [facility].  Had nausea after lytics, no diaphoresis some sob.    He received lytics     Lives in [facility] insurance    Medications / Drug list prior to admission:  No current facility-administered medications on file prior to encounter.     Current Outpatient Medications on File Prior to Encounter   Medication Sig Dispense Refill    levothyroxine (SYNTHROID) 75 MCG Tab Take 75 mcg by mouth every morning on an empty stomach.         Current list of administered Medications:    Current Facility-Administered Medications:     heparin infusion 25,000 units in 500 mL 0.45% NACL, 0-30 Units/kg/hr (Adjusted), Intravenous, Continuous, Ezekiel York M.D., Last Rate: 24 mL/hr at 03/06/23 0240, 12 Units/kg/hr at 03/06/23 0240    heparin injection 2,000 Units, 2,000 Units, Intravenous, PRN, Ezekiel York M.D.    clopidogrel (PLAVIX) tablet 75 mg, 75 mg, Oral, DAILY, Fracisco Olguin M.D., 75 mg at 03/06/23 0641    atorvastatin (LIPITOR) tablet 80 mg, 80 mg, Oral, Q EVENING, Fracisco Olguin M.D.    senna-docusate (PERICOLACE or SENOKOT S) 8.6-50 MG per tablet 2 Tablet, 2 Tablet, Oral, BID **AND** polyethylene glycol/lytes (MIRALAX) PACKET 1 Packet, 1 Packet, Oral, QDAY PRN **AND** magnesium hydroxide (MILK OF MAGNESIA) suspension 30 mL, 30 mL, Oral, QDAY PRN **AND** bisacodyl (DULCOLAX) suppository 10 mg, 10 mg, Rectal, QDAY PRN, Fracisco Olguin M.D.    hydrALAZINE (APRESOLINE) injection 10 mg, 10 mg, Intravenous, Q4HRS PRN, Fracisco Olguin M.D.    insulin regular (HumuLIN  "R,NovoLIN R) injection, 1-6 Units, Subcutaneous, Q6HRS **AND** POC blood glucose manual result, , , Q6H **AND** NOTIFY MD and PharmD, , , Once **AND** Administer 20 grams of glucose (approximately 8 ounces of fruit juice) every 15 minutes PRN FSBG less than 70 mg/dL, , , PRN **AND** dextrose 10 % BOLUS 25 g, 25 g, Intravenous, Q15 MIN PRN, Fracisco Olguin M.D.    levothyroxine (SYNTHROID) tablet 75 mcg, 75 mcg, Oral, AM ES, Fracisco Olguin M.D., 75 mcg at 03/06/23 0640    [START ON 3/7/2023] thiamine (B-1) IVPB 100 mg in 100 mL D5W (premix), 100 mg, Intravenous, DAILY, Fracisco Olguin M.D.    SODIUM BICARBONATE 8.4 % IV SOLN, , , ,     Past Medical History:   Diagnosis Date    Hyperlipidemia     Hypothyroidism        Past Surgical History:   Procedure Laterality Date    OTHER ORTHOPEDIC SURGERY         Family History   Problem Relation Age of Onset    Heart Disease Mother         pacemaker     Patient family history was personally reviewed, no pertinent family history to current presentation    Social History     Tobacco Use    Smoking status: Never    Smokeless tobacco: Never   Vaping Use    Vaping Use: Never used   Substance Use Topics    Alcohol use: Yes     Comment: socially    Drug use: Never       ALLERGIES:  No Known Allergies    Review of systems:  A complete review of symptoms was reviewed with patient. This is reviewed in H&P and PMH. ALL OTHERS reviewed and negative    Physical exam:  Patient Vitals for the past 24 hrs:   BP Temp Temp src Pulse Resp SpO2 Height Weight   03/06/23 0600 121/61 -- -- (!) 57 19 97 % -- --   03/06/23 0500 112/58 -- -- (!) 49 20 96 % -- --   03/06/23 0400 125/62 36.5 °C (97.7 °F) Temporal 78 (!) 24 94 % -- --   03/06/23 0300 124/67 36.3 °C (97.4 °F) Temporal 69 (!) 23 94 % -- --   03/06/23 0200 -- -- -- 79 20 96 % -- --   03/06/23 0130 135/69 -- -- 81 15 91 % 1.93 m (6' 4\") 118 kg (260 lb 2.3 oz)   03/06/23 0123 135/67 -- -- 81 19 97 % -- --   03/06/23 0100 123/72 36.7 " "°C (98 °F) Temporal 90 20 96 % -- --   03/06/23 0039 -- -- -- -- -- -- 1.93 m (6' 4\") 120 kg (264 lb 8.8 oz)     General: No acute distress.   EYES: no jaundice  HEENT: OP clear   Neck:  No JVD.   CVS:  [ RRR.   Resp: Normal respiratory effort,   Abdomen: ND,  Skin: Grossly nothing acute no obvious rashes  Neurological: Alert, Moves all extremities  Extremities:   [ no edema. No cyanosis.       Data:  Laboratory studies personally reviewed by me:  Recent Results (from the past 24 hour(s))   CBC WITH DIFFERENTIAL    Collection Time: 03/05/23  9:45 PM   Result Value Ref Range    WBC 16.5 (H) 4.8 - 10.8 K/uL    RBC 5.52 4.70 - 6.10 M/uL    Hemoglobin 16.9 14.0 - 18.0 g/dL    Hematocrit 49.3 42.0 - 52.0 %    MCV 89.3 80.0 - 94.0 fL    MCH 30.6 28.7 - 33.1 pg    MCHC 34.3 33.0 - 37.0 g/dL    RDW 12.4 11.5 - 14.5 %    Platelet Count 261 130 - 400 K/uL    MPV 8.9 7.4 - 10.4 fL    Neutrophils Automated 85.9 (H) 39.0 - 70.0 %    Lymphocytes Automated 8.7 (L) 21.0 - 50.0 %    Monocytes Automated 4.9 1.7 - 10.0 %    Eosinophils Automated 0.1 0.0 - 5.0 %    Basophils Automated 0.4 0.0 - 3.0 %    Abs Neutrophils Automated 14.1 (H) 1.8 - 7.7 K/uL    Abs Lymph Automated 1.4 1.2 - 4.8 K/uL    Monos (Absolute) 0.8 0.2 - 0.9 K/uL   COMP METABOLIC PANEL    Collection Time: 03/05/23  9:45 PM   Result Value Ref Range    Sodium 136 (L) 137 - 145 mmol/L    Potassium 4.4 3.5 - 5.1 mmol/L    Chloride 103 98 - 107 mmol/L    Co2 20 (L) 22 - 30 mmol/L    Anion Gap 13 (H) 4 - 12 mmol/L    Glucose 135 (H) 70 - 100 mg/dL    Bun 19 9 - 20 mg/dL    Creatinine 0.9 0.7 - 1.3 mg/dL    Calcium 9.9 8.7 - 10.5 mg/dL    AST(SGOT) 51 (H) 15 - 46 U/L    ALT(SGPT) 47 13 - 69 U/L    Alkaline Phosphatase 89 38 - 126 U/L    Total Bilirubin 1.9 (H) 0.2 - 1.3 mg/dL    Albumin 5.1 (H) 3.5 - 5.0 g/dL    Total Protein 8.6 (H) 6.3 - 8.2 g/dL    A-G Ratio 1.5 1.0 - 2.0   TROPONIN    Collection Time: 03/05/23  9:45 PM   Result Value Ref Range    Troponin I 0.12 () " 0.00 - 0.04 ng/mL   ESTIMATED GFR    Collection Time: 23  9:45 PM   Result Value Ref Range    GFR (CKD-EPI) 93 >60 mL/min/1.73 m 2   PTT    Collection Time: 23  9:50 PM   Result Value Ref Range    APTT 25.9 21.8 - 33.8 sec   PROTHROMBIN TIME    Collection Time: 23  9:50 PM   Result Value Ref Range    PT 10.7 9.3 - 12.4 sec    INR 1.00    SARS-CoV-2 PCR (Single Test)    Collection Time: 23  9:50 PM    Specimen: Respirate   Result Value Ref Range    SARS-CoV-2 by PCR NEGATIVE Negative    SARS-CoV-2 Source NP Swab    EKG    Collection Time: 23 12:37 AM   Result Value Ref Range    Report       [facility] Emergency Dept.    Test Date:  2023  Pt Name:    MARGARITA DAMON                  Department: ER  MRN:        [id number]                      Room:       [facility]  Gender:     Male                         Technician: [id number]  :        1955                   Requested By:JABIER BUITRAGO  Order #:    [id number]                    Reading MD:    Measurements  Intervals                                Axis  Rate:       79                           P:          67  NJ:         38                           QRS:        73  QRSD:       107                          T:          73  QT:         394  QTc:        452    Interpretive Statements  Sinus rhythm  Multiple premature complexes, vent & supraven  Sinus pause  Short NJ interval  Probable left atrial enlargement  No previous ECG available for comparison     aPTT    Collection Time: 23 12:41 AM   Result Value Ref Range    APTT 53.7 (H) 24.7 - 36.0 sec   Prothrombin Time    Collection Time: 23 12:41 AM   Result Value Ref Range    PT 14.5 12.0 - 14.6 sec    INR 1.14 (H) 0.87 - 1.13   Heparin Xa (Unfractionated)    Collection Time: 23 12:41 AM   Result Value Ref Range    Heparin Xa (UFH) 0.22 IU/mL   CBC w/ Differential    Collection Time: 23 12:41 AM   Result Value Ref Range    WBC 14.4 (H) 4.8 - 10.8 K/uL     RBC 5.14 4.70 - 6.10 M/uL    Hemoglobin 15.7 14.0 - 18.0 g/dL    Hematocrit 46.0 42.0 - 52.0 %    MCV 89.5 81.4 - 97.8 fL    MCH 30.5 27.0 - 33.0 pg    MCHC 34.1 33.7 - 35.3 g/dL    RDW 41.9 35.9 - 50.0 fL    Platelet Count 231 164 - 446 K/uL    MPV 9.2 9.0 - 12.9 fL    Neutrophils-Polys 84.70 (H) 44.00 - 72.00 %    Lymphocytes 10.70 (L) 22.00 - 41.00 %    Monocytes 4.00 0.00 - 13.40 %    Eosinophils 0.00 0.00 - 6.90 %    Basophils 0.30 0.00 - 1.80 %    Immature Granulocytes 0.30 0.00 - 0.90 %    Nucleated RBC 0.00 /100 WBC    Neutrophils (Absolute) 12.19 (H) 1.82 - 7.42 K/uL    Lymphs (Absolute) 1.54 1.00 - 4.80 K/uL    Monos (Absolute) 0.58 0.00 - 0.85 K/uL    Eos (Absolute) 0.00 0.00 - 0.51 K/uL    Baso (Absolute) 0.05 0.00 - 0.12 K/uL    Immature Granulocytes (abs) 0.05 0.00 - 0.11 K/uL    NRBC (Absolute) 0.00 K/uL   Complete Metabolic Panel (CMP)    Collection Time: 03/06/23 12:41 AM   Result Value Ref Range    Sodium 132 (L) 135 - 145 mmol/L    Potassium 4.5 3.6 - 5.5 mmol/L    Chloride 100 96 - 112 mmol/L    Co2 16 (L) 20 - 33 mmol/L    Anion Gap 16.0 7.0 - 16.0    Glucose 150 (H) 65 - 99 mg/dL    Bun 17 8 - 22 mg/dL    Creatinine 0.75 0.50 - 1.40 mg/dL    Calcium 9.3 8.5 - 10.5 mg/dL    AST(SGOT) 79 (H) 12 - 45 U/L    ALT(SGPT) 41 2 - 50 U/L    Alkaline Phosphatase 69 30 - 99 U/L    Total Bilirubin 1.5 0.1 - 1.5 mg/dL    Albumin 4.5 3.2 - 4.9 g/dL    Total Protein 7.4 6.0 - 8.2 g/dL    Globulin 2.9 1.9 - 3.5 g/dL    A-G Ratio 1.6 g/dL   Troponin STAT    Collection Time: 03/06/23 12:41 AM   Result Value Ref Range    Troponin T 687 (H) 6 - 19 ng/L   CORRECTED CALCIUM    Collection Time: 03/06/23 12:41 AM   Result Value Ref Range    Correct Calcium 8.9 8.5 - 10.5 mg/dL   ESTIMATED GFR    Collection Time: 03/06/23 12:41 AM   Result Value Ref Range    GFR (CKD-EPI) 98 >60 mL/min/1.73 m 2   EKG (NOW)    Collection Time: 03/06/23  1:00 AM   Result Value Ref Range    Report       [facility]  Emergency Dept.    Test Date:  2023  Pt Name:    MARGARITA DAMON                  Department: ER  MRN:        [id number]                      Room:       RD 04  Gender:     Male                         Technician:  :        1955                   Requested By:JABIER BUITRAGO  Order #:    [id number]                    Reading MD:    Measurements  Intervals                                Axis  Rate:       89                           P:  KY:                                      QRS:        -52  QRSD:       180                          T:          115  QT:         456  QTc:        555    Interpretive Statements  Accelerated junctional rhythm  Nonspecific IVCD with LAD  LVH with secondary repolarization abnormality  Compared to ECG 2023 00:37:59  Accelerated junctional rhythm now present  Intraventricular conduction delay now present  Left ventricular hypertrophy now present  Early repolarization now present  Sinus rhythm no longer present  Sinus  pause or arrest no longer present  Short [place] interval no longer present     EKG - STAT Once    Collection Time: 23  1:03 AM   Result Value Ref Range    Report       [facility] Emergency Dept.    Test Date:  2023  Pt Name:    MARGARITA DAMON                  Department: ER  MRN:        [id number]                      Room:        04  Gender:     Male                         Technician:  :        1955                   Requested By:ROGERIO TREVIÑO  Order #:    [id number]                    Reading MD:    Measurements  Intervals                                Axis  Rate:       69                           P:          58  KY:         197                          QRS:        52  QRSD:       108                          T:          38  QT:         410  QTc:        440    Interpretive Statements  Sinus rhythm  Atrial premature complexes  Inferior infarct, old  Compared to ECG 2023 01:00:08  Atrial premature complex(es) now  present  Myocardial infarct finding now present  Accelerated junctional rhythm no longer present  Intraventricular conduction delay no longer present  Left ventricular hypertrophy no longer present   Early repolarization no longer present     TSH WITH REFLEX TO FT4    Collection Time: 03/06/23  2:30 AM   Result Value Ref Range    TSH 5.080 0.380 - 5.330 uIU/mL   BETA-HYDROXYBUTYRIC ACID    Collection Time: 03/06/23  2:30 AM   Result Value Ref Range    beta-Hydroxybutyric Acid 0.68 (H) 0.02 - 0.27 mmol/L   LACTIC ACID    Collection Time: 03/06/23  2:30 AM   Result Value Ref Range    Lactic Acid 1.4 0.5 - 2.0 mmol/L   Salicylate    Collection Time: 03/06/23  2:30 AM   Result Value Ref Range    Salicylates, Quant. <1.0 (L) 15.0 - 25.0 mg/dL   HEMOGLOBIN A1C    Collection Time: 03/06/23  2:30 AM   Result Value Ref Range    Glycohemoglobin 5.5 4.0 - 5.6 %    Est Avg Glucose 111 mg/dL   TROPONIN    Collection Time: 03/06/23  2:30 AM   Result Value Ref Range    Troponin T 3296 (H) 6 - 19 ng/L   MAGNESIUM    Collection Time: 03/06/23  2:30 AM   Result Value Ref Range    Magnesium 2.1 1.5 - 2.5 mg/dL   Basic Metabolic Panel    Collection Time: 03/06/23  2:30 AM   Result Value Ref Range    Sodium 134 (L) 135 - 145 mmol/L    Potassium 4.2 3.6 - 5.5 mmol/L    Chloride 102 96 - 112 mmol/L    Co2 16 (L) 20 - 33 mmol/L    Glucose 135 (H) 65 - 99 mg/dL    Bun 16 8 - 22 mg/dL    Creatinine 0.65 0.50 - 1.40 mg/dL    Calcium 9.1 8.5 - 10.5 mg/dL    Anion Gap 16.0 7.0 - 16.0   ESTIMATED GFR    Collection Time: 03/06/23  2:30 AM   Result Value Ref Range    GFR (CKD-EPI) 102 >60 mL/min/1.73 m 2   POCT glucose device results    Collection Time: 03/06/23  2:31 AM   Result Value Ref Range    POC Glucose, Blood 138 (H) 65 - 99 mg/dL   EKG in four (4) hours    Collection Time: 03/06/23  5:05 AM   Result Value Ref Range    Report       [facility]    Test Date:  2023-03-06  Pt Name:    MARGARITA DAMON                  Department: ER  MRN:         [id number]                      Room:       12  Gender:     Male                         Technician: TYRESE  :        1955                   Requested By:ROGERIO NAVARRO SANTINOEDIN  Order #:    [id number]                    Reading MD: David Hyde MD    Measurements  Intervals                                Axis  Rate:       59                           P:          57  HI:         186                          QRS:        62  QRSD:       110                          T:          89  QT:         440  QTc:        436    Interpretive Statements  Sinus bradycardia  Atrial premature complex  Inferior infarct, recent  Electronically Signed On 3-6-2023 6:35:33 PST by David Hyde MD     Basic Metabolic Panel    Collection Time: 23  5:20 AM   Result Value Ref Range    Sodium 134 (L) 135 - 145 mmol/L    Potassium 4.3 3.6 - 5.5 mmol/L    Chloride 103 96 - 112 mmol/L    Co2 19 (L) 20 - 33 mmol/L    Glucose 130 (H) 65 - 99 mg/dL    Bun 15 8 - 22 mg/dL    Creatinine 0.58 0.50 - 1.40 mg/dL    Calcium 8.9 8.5 - 10.5 mg/dL    Anion Gap 12.0 7.0 - 16.0   MAGNESIUM    Collection Time: 23  5:20 AM   Result Value Ref Range    Magnesium 2.1 1.5 - 2.5 mg/dL   ESTIMATED GFR    Collection Time: 23  5:20 AM   Result Value Ref Range    GFR (CKD-EPI) 106 >60 mL/min/1.73 m 2   PHOSPHORUS    Collection Time: 23  5:20 AM   Result Value Ref Range    Phosphorus 3.9 2.5 - 4.5 mg/dL       Imaging:  DX-CHEST-PORTABLE (1 VIEW)   Final Result         1.  Left basilar atelectasis, no focal infiltrate      EC-ECHOCARDIOGRAM COMPLETE W/O CONT    (Results Pending)   CL-LEFT HEART CATHETERIZATION WITH POSSIBLE INTERVENTION    (Results Pending)           EKG tracings personally reviewed by me  S with inferior stemi improved post lytics      All pertinent features of laboratory and imaging reviewed including primary images where applicable      Principal Problem:    STEMI (ST elevation myocardial infarction) (HCC) POA: Yes  Active  Problems:    Hypothyroid POA: Unknown    High anion gap metabolic acidosis POA: Unknown  Resolved Problems:    * No resolved hospital problems. *      Assessment / Plan:   INFERIOR STEMI    S/p TNK    Aspiirn  Plavix  Statin  Betablocker  Check EF          I personally discussed his case with  Dr Joe Patton M.D.    No future appointments.    It is my pleasure to participate in the care of Mr. Fu.  Please do not hesitate to contact me with questions or concerns.    Anthony Ruiz MD PhD [facility]  Cardiologist [facility]    3/6/2023    Please note that this dictation was created using voice recognition software. There may be errors I did not discover before finalizing the note.

## 2023-03-06 NOTE — PROGRESS NOTES
Admitted the early am please see Dr Olguin full consult note.     67y M presented to [place] with STEMI given TNK 2230 3/5 and transferred here.     Rounding report:  Neuro: neuro intact no pain  HR: 50-80's  SBP: 110-150's  Tmax: afebrile  GI: NPO for cath  UOP: adequate  Lines: peripheral IV  Resp: r/a   Vte: heparin gtt  PPI/H2:n/a  Antibx: none    A/P:  Vitals stable on r/a   Nonanion gap acidosis, betahydroxy negative, lactate 1.4, CXR reviewed, EKG with inferior and posterior ROLY, CXR with mild congestion.   Post TNK serial neuro checks    Give 100meq of bicarb, follow up CMP at 2pm  Follow up cardiac cath results    Patient remains in critical condition from acute STEMI on heparin gtt and post TNK protocols . Critical care time provided was 40 minutes in addition to Dr Olguin 60minutes on same day. This excludes all separate billable procedures.     Joe Patton MD  Critical Care Medicine

## 2023-03-06 NOTE — ASSESSMENT & PLAN NOTE
- On presentation Patient with a bicarbonate of 16, elevated anion gap blood sugar 150, no history of diabetes we will check BHB  Received a fluid bolus on route, awake alert well-perfused warm without hypotension, unlikely representative of shock  - Got a 250 cc LR bolus   - Improving Bic now 19  - CTM  3/7: Resolved

## 2023-03-06 NOTE — PROGRESS NOTES
Monitor Summary:  SR 60-70's  Frequent PVC's and PAC's  30 second run of VT - self terminated.

## 2023-03-06 NOTE — CONSULTS
Critical Care Consultation    Date of consult: 3/6/2023    Referring Physician  Ezekiel York M.D.;Cla*    Reason for Consultation  ST segment elevation MI    History of Presenting Illness  67 y.o. male who presented 3/6/2023 with with a STEMI.    Patient is healthy with only past medical history of hypothyroidism, borderline hypercholesterolemia per report not on statin, who was in [facility] around 5 PM doing some shoveling of snow around his roof when he started to feel some substernal chest pain and some shortness of breath.  He presented to the ED and [place] at [place] where he was noted to have an inferior ST segment elevation MI with ST segment depressions in the precordial leads consistent with a posterior STEMI.  He was given TNKase at approximately 2230 and transferred here for definitive management.  Prior to receiving lytics the patient had V. tach with chest pain and spontaneously converted back to sinus rhythm.  Since the administration of lytics and has not had recurrent ventricular arrhythmia.      On route in the ground ambulance he experienced some bigeminy and some reperfusion arrhythmias without any symptoms.  Here in the ED he has been in sinus rhythm with multiple PVCs, occasional pauses without recurrent STEMI on serial EKGs and has had no chest pain or shortness of breath.    At [facility] given TNKase approximately 2230, , Plavix 300, heparin drip  Here in our ED serial EKGs with sinus rhythm, PVCs, no recurrent ST segment elevations, continued heparin drip and another 300 of Plavix for a total of 600.    My evaluation the patient is asymptomatic he is well-appearing he denies any chest pain shortness of breath nausea or vomiting and is sitting upright well-appearing and has no complaints at this time    Code Status  Full Code    Review of Systems  Review of Systems   All other systems reviewed and are negative.    Past Medical History   has a past medical history of Hyperlipidemia  and Hypothyroidism.    Surgical History   has a past surgical history that includes other orthopedic surgery.    Family History  family history is not on file.    Social History   reports that he has never smoked. He has never used smokeless tobacco. He reports current alcohol use. He reports that he does not use drugs.    Medications  Home Medications       Reviewed by Anahi Christianson R.N. (Registered Nurse) on 03/06/23 at 0106  Med List Status: Not Addressed     Medication Last Dose Status   levothyroxine (SYNTHROID) 75 MCG Tab  Active                  Current Facility-Administered Medications   Medication Dose Route Frequency Provider Last Rate Last Admin    heparin infusion 25,000 units in 500 mL 0.45% NACL  0-30 Units/kg/hr (Adjusted) Intravenous Continuous Ezekiel York M.D. 22 mL/hr at 03/06/23 0049 11 Units/kg/hr at 03/06/23 0049    heparin injection 2,000 Units  2,000 Units Intravenous PRN Ezekiel York M.D.        clopidogrel (PLAVIX) tablet 75 mg  75 mg Oral DAILY Fracisco Olguin M.D.        atorvastatin (LIPITOR) tablet 80 mg  80 mg Oral Q EVENING Fracisco Olguin M.D.        senna-docusate (PERICOLACE or SENOKOT S) 8.6-50 MG per tablet 2 Tablet  2 Tablet Oral BID Fracisco Olguin M.D.        And    polyethylene glycol/lytes (MIRALAX) PACKET 1 Packet  1 Packet Oral QDAY PRN Fracisco Olguin M.D.        And    magnesium hydroxide (MILK OF MAGNESIA) suspension 30 mL  30 mL Oral QDAY PRN Fracisco Olguin M.D.        And    bisacodyl (DULCOLAX) suppository 10 mg  10 mg Rectal QDAY PRN Fracisco Olguin M.D.        hydrALAZINE (APRESOLINE) injection 10 mg  10 mg Intravenous Q4HRS PRN Fracisco Olguin M.D.        insulin regular (HumuLIN R,NovoLIN R) injection  1-6 Units Subcutaneous Q6HRS Fracisco Olguin M.D.        And    dextrose 10 % BOLUS 25 g  25 g Intravenous Q15 MIN PRN Fracisco Olguin M.D.        levothyroxine (SYNTHROID) tablet 75 mcg  75 mcg Oral AM ES Fracisco Olguin  M.D.        thiamine (B-1) IVPB 100 mg in 100 mL D5W (premix)  100 mg Intravenous DAILY Fracisco Olguin M.D.         cc BOLUS  250 mL Intravenous Once Fracisco Olguin M.D.           Allergies  No Known Allergies    Vital Signs last 24 hours  Temp:  [36.7 °C (98 °F)] 36.7 °C (98 °F)  Pulse:  [81-90] 81  Resp:  [15-20] 15  BP: (123-135)/(67-72) 135/69  SpO2:  [91 %-97 %] 91 %    Physical Exam  Physical Exam  Constitutional:       General: He is not in acute distress.     Appearance: Normal appearance.   HENT:      Head: Normocephalic and atraumatic.      Nose: Nose normal.      Mouth/Throat:      Mouth: Mucous membranes are dry.   Eyes:      Extraocular Movements: Extraocular movements intact.      Pupils: Pupils are equal, round, and reactive to light.   Cardiovascular:      Rate and Rhythm: Normal rate. Rhythm irregular.      Pulses: Normal pulses.      Heart sounds: No murmur heard.    No gallop.   Pulmonary:      Effort: Pulmonary effort is normal. No respiratory distress.      Breath sounds: Normal breath sounds.   Abdominal:      General: Abdomen is flat.      Palpations: Abdomen is soft.   Musculoskeletal:      Cervical back: Neck supple.      Right lower leg: No edema.      Left lower leg: No edema.   Skin:     General: Skin is warm.      Capillary Refill: Capillary refill takes less than 2 seconds.   Neurological:      General: No focal deficit present.      Mental Status: He is alert and oriented to person, place, and time. Mental status is at baseline.       Fluids  No intake or output data in the 24 hours ending 03/06/23 0203    Laboratory  Recent Results (from the past 48 hour(s))   CBC WITH DIFFERENTIAL    Collection Time: 03/05/23  9:45 PM   Result Value Ref Range    WBC 16.5 (H) 4.8 - 10.8 K/uL    RBC 5.52 4.70 - 6.10 M/uL    Hemoglobin 16.9 14.0 - 18.0 g/dL    Hematocrit 49.3 42.0 - 52.0 %    MCV 89.3 80.0 - 94.0 fL    MCH 30.6 28.7 - 33.1 pg    MCHC 34.3 33.0 - 37.0 g/dL    RDW 12.4 11.5  - 14.5 %    Platelet Count 261 130 - 400 K/uL    MPV 8.9 7.4 - 10.4 fL    Neutrophils Automated 85.9 (H) 39.0 - 70.0 %    Lymphocytes Automated 8.7 (L) 21.0 - 50.0 %    Monocytes Automated 4.9 1.7 - 10.0 %    Eosinophils Automated 0.1 0.0 - 5.0 %    Basophils Automated 0.4 0.0 - 3.0 %    Abs Neutrophils Automated 14.1 (H) 1.8 - 7.7 K/uL    Abs Lymph Automated 1.4 1.2 - 4.8 K/uL    Monos (Absolute) 0.8 0.2 - 0.9 K/uL   COMP METABOLIC PANEL    Collection Time: 03/05/23  9:45 PM   Result Value Ref Range    Sodium 136 (L) 137 - 145 mmol/L    Potassium 4.4 3.5 - 5.1 mmol/L    Chloride 103 98 - 107 mmol/L    Co2 20 (L) 22 - 30 mmol/L    Anion Gap 13 (H) 4 - 12 mmol/L    Glucose 135 (H) 70 - 100 mg/dL    Bun 19 9 - 20 mg/dL    Creatinine 0.9 0.7 - 1.3 mg/dL    Calcium 9.9 8.7 - 10.5 mg/dL    AST(SGOT) 51 (H) 15 - 46 U/L    ALT(SGPT) 47 13 - 69 U/L    Alkaline Phosphatase 89 38 - 126 U/L    Total Bilirubin 1.9 (H) 0.2 - 1.3 mg/dL    Albumin 5.1 (H) 3.5 - 5.0 g/dL    Total Protein 8.6 (H) 6.3 - 8.2 g/dL    A-G Ratio 1.5 1.0 - 2.0   TROPONIN    Collection Time: 03/05/23  9:45 PM   Result Value Ref Range    Troponin I 0.12 (HH) 0.00 - 0.04 ng/mL   ESTIMATED GFR    Collection Time: 03/05/23  9:45 PM   Result Value Ref Range    GFR (CKD-EPI) 93 >60 mL/min/1.73 m 2   PTT    Collection Time: 03/05/23  9:50 PM   Result Value Ref Range    APTT 25.9 21.8 - 33.8 sec   PROTHROMBIN TIME    Collection Time: 03/05/23  9:50 PM   Result Value Ref Range    PT 10.7 9.3 - 12.4 sec    INR 1.00    SARS-CoV-2 PCR (Single Test)    Collection Time: 03/05/23  9:50 PM    Specimen: Respirate   Result Value Ref Range    SARS-CoV-2 by PCR NEGATIVE Negative    SARS-CoV-2 Source NP Swab    EKG    Collection Time: 03/06/23 12:37 AM   Result Value Ref Range    Report       [facility] Emergency Dept.    Test Date:  2023-03-06  Pt Name:    MARGARITA DAMON                  Department: ER  MRN:        [id number]                      Room:         04  Gender:     Male                         Technician: [id number]  :        1955                   Requested By:JABIER BUITRAGO  Order #:    [id number]                    Reading MD:    Measurements  Intervals                                Axis  Rate:       79                           P:          67  NM:         38                           QRS:        73  QRSD:       107                          T:          73  QT:         394  QTc:        452    Interpretive Statements  Sinus rhythm  Multiple premature complexes, vent & supraven  Sinus pause  Short NM interval  Probable left atrial enlargement  No previous ECG available for comparison     aPTT    Collection Time: 23 12:41 AM   Result Value Ref Range    APTT 53.7 (H) 24.7 - 36.0 sec   Prothrombin Time    Collection Time: 23 12:41 AM   Result Value Ref Range    PT 14.5 12.0 - 14.6 sec    INR 1.14 (H) 0.87 - 1.13   Heparin Xa (Unfractionated)    Collection Time: 23 12:41 AM   Result Value Ref Range    Heparin Xa (UFH) 0.22 IU/mL   CBC w/ Differential    Collection Time: 23 12:41 AM   Result Value Ref Range    WBC 14.4 (H) 4.8 - 10.8 K/uL    RBC 5.14 4.70 - 6.10 M/uL    Hemoglobin 15.7 14.0 - 18.0 g/dL    Hematocrit 46.0 42.0 - 52.0 %    MCV 89.5 81.4 - 97.8 fL    MCH 30.5 27.0 - 33.0 pg    MCHC 34.1 33.7 - 35.3 g/dL    RDW 41.9 35.9 - 50.0 fL    Platelet Count 231 164 - 446 K/uL    MPV 9.2 9.0 - 12.9 fL    Neutrophils-Polys 84.70 (H) 44.00 - 72.00 %    Lymphocytes 10.70 (L) 22.00 - 41.00 %    Monocytes 4.00 0.00 - 13.40 %    Eosinophils 0.00 0.00 - 6.90 %    Basophils 0.30 0.00 - 1.80 %    Immature Granulocytes 0.30 0.00 - 0.90 %    Nucleated RBC 0.00 /100 WBC    Neutrophils (Absolute) 12.19 (H) 1.82 - 7.42 K/uL    Lymphs (Absolute) 1.54 1.00 - 4.80 K/uL    Monos (Absolute) 0.58 0.00 - 0.85 K/uL    Eos (Absolute) 0.00 0.00 - 0.51 K/uL    Baso (Absolute) 0.05 0.00 - 0.12 K/uL    Immature Granulocytes (abs) 0.05 0.00 - 0.11 K/uL    NRBC  (Absolute) 0.00 K/uL   Complete Metabolic Panel (CMP)    Collection Time: 23 12:41 AM   Result Value Ref Range    Sodium 132 (L) 135 - 145 mmol/L    Potassium 4.5 3.6 - 5.5 mmol/L    Chloride 100 96 - 112 mmol/L    Co2 16 (L) 20 - 33 mmol/L    Anion Gap 16.0 7.0 - 16.0    Glucose 150 (H) 65 - 99 mg/dL    Bun 17 8 - 22 mg/dL    Creatinine 0.75 0.50 - 1.40 mg/dL    Calcium 9.3 8.5 - 10.5 mg/dL    AST(SGOT) 79 (H) 12 - 45 U/L    ALT(SGPT) 41 2 - 50 U/L    Alkaline Phosphatase 69 30 - 99 U/L    Total Bilirubin 1.5 0.1 - 1.5 mg/dL    Albumin 4.5 3.2 - 4.9 g/dL    Total Protein 7.4 6.0 - 8.2 g/dL    Globulin 2.9 1.9 - 3.5 g/dL    A-G Ratio 1.6 g/dL   Troponin STAT    Collection Time: 23 12:41 AM   Result Value Ref Range    Troponin T 687 (H) 6 - 19 ng/L   CORRECTED CALCIUM    Collection Time: 23 12:41 AM   Result Value Ref Range    Correct Calcium 8.9 8.5 - 10.5 mg/dL   ESTIMATED GFR    Collection Time: 23 12:41 AM   Result Value Ref Range    GFR (CKD-EPI) 98 >60 mL/min/1.73 m 2   EKG (NOW)    Collection Time: 23  1:00 AM   Result Value Ref Range    Report       [facility] Emergency Dept.    Test Date:  2023  Pt Name:    MARGARITA DAMON                  Department: ER  MRN:        [id number]                      Room:       RD 04  Gender:     Male                         Technician:  :        1955                   Requested By:JABIER BUITRAGO  Order #:    [id number]                    Reading MD:    Measurements  Intervals                                Axis  Rate:       89                           P:  MO:                                      QRS:        -52  QRSD:       180                          T:          115  QT:         456  QTc:        555    Interpretive Statements  Accelerated junctional rhythm  Nonspecific IVCD with LAD  LVH with secondary repolarization abnormality  Compared to ECG 2023 00:37:59  Accelerated junctional rhythm now  present  Intraventricular conduction delay now present  Left ventricular hypertrophy now present  Early repolarization now present  Sinus rhythm no longer present  Sinus  pause or arrest no longer present  Short CO interval no longer present     EKG - STAT Once    Collection Time: 23  1:03 AM   Result Value Ref Range    Report       [facility] Emergency Dept.    Test Date:  2023  Pt Name:    MARGARITA DAMON                  Department: ER  MRN:        [id number]                      Room:        04  Gender:     Male                         Technician:  :        1955                   Requested By:ROGERIO TREVIÑO  Order #:    [id number]                    Reading MD:    Measurements  Intervals                                Axis  Rate:       69                           P:          58  CO:         197                          QRS:        52  QRSD:       108                          T:          38  QT:         410  QTc:        440    Interpretive Statements  Sinus rhythm  Atrial premature complexes  Inferior infarct, old  Compared to ECG 2023 01:00:08  Atrial premature complex(es) now present  Myocardial infarct finding now present  Accelerated junctional rhythm no longer present  Intraventricular conduction delay no longer present  Left ventricular hypertrophy no longer present   Early repolarization no longer present         Imaging  EC-ECHOCARDIOGRAM COMPLETE W/O CONT    (Results Pending)   DX-CHEST-PORTABLE (1 VIEW)    (Results Pending)       Assessment/Plan  * STEMI (ST elevation myocardial infarction) (HCC)- (present on admission)  Assessment & Plan  EKG: Presenting EKG with inferior ST segment elevation, with deep ST depressions across precordial leads  Serial EKGs in ED post lytics initially with accelerated junctional wide-complex rhythm, followed by sinus rhythm with PVCs, PACs and occasional irregularity.    LHC: Plan for left heart cath in the morning, providing  no recurrent chest pain or ST segment elevations  AC: Heparin drip today goal 0.3-0.7  Serial EKGs  Trend troponin  Dual antiplatelet  We will hold beta-blocker given inferior and right-sided MI  High intensity Statin    ECHO: Pending    High anion gap metabolic acidosis  Assessment & Plan  Patient with a bicarbonate of 16, elevated anion gap blood sugar 150, no history of diabetes we will check BHB  Received a fluid bolus on route, awake alert well-perfused warm without hypotension, unlikely representative of shock  We will give a 250 cc LR bolus here    Check A1c and BHB  Salicylate sent  Thiamine  We will check lactate  Trend markers of perfusion  TTE ordered      Hypothyroid  Assessment & Plan  Check TSH    Continue home thyroxine        Discussed patient condition and risk of morbidity and/or mortality with RN, RT, Therapies, Pharmacy, Patient, and cardiology.    The patient remains critically ill.  Critical care time = 60 minutes in directly providing and coordinating critical care and extensive data review.  No time overlap and excludes procedures.

## 2023-03-06 NOTE — ED NOTES
Noted oxygen saturation of 89-90% on room air while sleeping  Applied oxygen inhalation at 1 LPM via nasal cannula

## 2023-03-06 NOTE — ED PROVIDER NOTES
ED Provider Note    CHIEF COMPLAINT  Chief Complaint   Patient presents with    Chest Pain     STEMI transfer from [facility]       EXTERNAL RECORDS REVIEWED  Inpatient Notes ER note from [facility] reviewed    HPI/ROS  LIMITATION TO HISTORY   Select: : None  OUTSIDE HISTORIAN(S):  EMS critical care transport team with report directly to myself    Albert Fu is a 67 y.o. male who presents to the emerge apartment as a transfer from [facility].  Patient presented there feeling unwell.  He reports that he had been clearing his  from snow and [place].  He felt relatively fatigued with this process.  He then went inside to warm up and took a prolonged hot shower.  After getting out of shower he was then short of breath with moderate chest discomfort.  Ultimately his wife prompted him to go to the hospital at which point he presented to [facility].  With their initial work-up they identified an inferior STEMI.  They discussed the case with our cardiologist prior to transfer and ultimately the patient received aspirin, heparin as well as TNK.    EMS crew reporting that the patient did have a brief period of hypotension and a 250 cc normal saline bolus was provided with improving blood pressure which currently has a systolic of 115.  He has also had few episodes of bigeminy as well as a very brief run of VT.    Currently the patient is reporting only 1 out of 10 chest discomfort.  No current dyspnea.    PAST MEDICAL HISTORY   has a past medical history of Hyperlipidemia and Hypothyroidism.    SURGICAL HISTORY   has a past surgical history that includes other orthopedic surgery.    FAMILY HISTORY  No family history on file.    SOCIAL HISTORY  Social History     Tobacco Use    Smoking status: Never    Smokeless tobacco: Never   Vaping Use    Vaping Use: Never used   Substance and Sexual Activity    Alcohol use: Yes     Comment: socially    Drug use: Never    Sexual activity: Not on file       CURRENT  "MEDICATIONS  Home Medications       Reviewed by Anahi Christianson R.N. (Registered Nurse) on 03/06/23 at 0106  Med List Status: Not Addressed     Medication Last Dose Status   levothyroxine (SYNTHROID) 75 MCG Tab  Active                    ALLERGIES  No Known Allergies    PHYSICAL EXAM  VITAL SIGNS: /69   Pulse 81   Temp 36.7 °C (98 °F) (Temporal)   Resp 15   Ht 1.93 m (6' 4\")   Wt 120 kg (264 lb 8.8 oz)   SpO2 91%   BMI 32.20 kg/m²        Pulse ox interpretation: I interpret this pulse ox as normal.  Constitutional: Alert in no apparent distress.  HENT: No signs of trauma, Bilateral external ears normal, Nose normal.   Eyes: Pupils are equal and reactive  Neck: Normal range of motion, No tenderness, Supple  Cardiovascular: Regular rate and rhythm, no murmurs.   Thorax & Lungs: Normal breath sounds, No respiratory distress, No wheezing, No chest tenderness.   Abdomen: Bowel sounds normal, Soft, No tenderness  Skin: Warm, Dry, No erythema, No rash.   Extremities: Intact distal pulses  Musculoskeletal: Good range of motion in all major joints. No tenderness to palpation or major deformities noted.   Neurologic: Alert , Normal motor function, Normal sensory function, No focal deficits noted.   Psychiatric: Affect normal, Judgment normal, Mood normal.         DIAGNOSTIC STUDIES / PROCEDURES      LABS  Results for orders placed or performed during the hospital encounter of 03/06/23   aPTT   Result Value Ref Range    APTT 53.7 (H) 24.7 - 36.0 sec   Prothrombin Time   Result Value Ref Range    PT 14.5 12.0 - 14.6 sec    INR 1.14 (H) 0.87 - 1.13   Heparin Xa (Unfractionated)   Result Value Ref Range    Heparin Xa (UFH) 0.22 IU/mL   CBC w/ Differential   Result Value Ref Range    WBC 14.4 (H) 4.8 - 10.8 K/uL    RBC 5.14 4.70 - 6.10 M/uL    Hemoglobin 15.7 14.0 - 18.0 g/dL    Hematocrit 46.0 42.0 - 52.0 %    MCV 89.5 81.4 - 97.8 fL    MCH 30.5 27.0 - 33.0 pg    MCHC 34.1 33.7 - 35.3 g/dL    RDW 41.9 35.9 - 50.0 fL "    Platelet Count 231 164 - 446 K/uL    MPV 9.2 9.0 - 12.9 fL    Neutrophils-Polys 84.70 (H) 44.00 - 72.00 %    Lymphocytes 10.70 (L) 22.00 - 41.00 %    Monocytes 4.00 0.00 - 13.40 %    Eosinophils 0.00 0.00 - 6.90 %    Basophils 0.30 0.00 - 1.80 %    Immature Granulocytes 0.30 0.00 - 0.90 %    Nucleated RBC 0.00 /100 WBC    Neutrophils (Absolute) 12.19 (H) 1.82 - 7.42 K/uL    Lymphs (Absolute) 1.54 1.00 - 4.80 K/uL    Monos (Absolute) 0.58 0.00 - 0.85 K/uL    Eos (Absolute) 0.00 0.00 - 0.51 K/uL    Baso (Absolute) 0.05 0.00 - 0.12 K/uL    Immature Granulocytes (abs) 0.05 0.00 - 0.11 K/uL    NRBC (Absolute) 0.00 K/uL   Complete Metabolic Panel (CMP)   Result Value Ref Range    Sodium 132 (L) 135 - 145 mmol/L    Potassium 4.5 3.6 - 5.5 mmol/L    Chloride 100 96 - 112 mmol/L    Co2 16 (L) 20 - 33 mmol/L    Anion Gap 16.0 7.0 - 16.0    Glucose 150 (H) 65 - 99 mg/dL    Bun 17 8 - 22 mg/dL    Creatinine 0.75 0.50 - 1.40 mg/dL    Calcium 9.3 8.5 - 10.5 mg/dL    AST(SGOT) 79 (H) 12 - 45 U/L    ALT(SGPT) 41 2 - 50 U/L    Alkaline Phosphatase 69 30 - 99 U/L    Total Bilirubin 1.5 0.1 - 1.5 mg/dL    Albumin 4.5 3.2 - 4.9 g/dL    Total Protein 7.4 6.0 - 8.2 g/dL    Globulin 2.9 1.9 - 3.5 g/dL    A-G Ratio 1.6 g/dL   Troponin STAT   Result Value Ref Range    Troponin T 687 (H) 6 - 19 ng/L   CORRECTED CALCIUM   Result Value Ref Range    Correct Calcium 8.9 8.5 - 10.5 mg/dL   ESTIMATED GFR   Result Value Ref Range    GFR (CKD-EPI) 98 >60 mL/min/1.73 m 2   EKG   Result Value Ref Range    Report       [facility] Emergency Dept.    Test Date:  2023  Pt Name:    MARGARITA DAMON                  Department: ER  MRN:        [id number]                      Room:        04  Gender:     Male                         Technician: [id number]  :        1955                   Requested By:JABIER BUITRAGO  Order #:    [id number]                    Reading MD:    Measurements  Intervals                                 Axis  Rate:       79                           P:          67  TX:         38                           QRS:        73  QRSD:       107                          T:          73  QT:         394  QTc:        452    Interpretive Statements  Sinus rhythm  Multiple premature complexes, vent & supraven  Sinus pause  Short TX interval  Probable left atrial enlargement  No previous ECG available for comparison     EKG (NOW)   Result Value Ref Range    Report       [facility] Emergency Dept.    Test Date:  2023  Pt Name:    MARGARITA DAMON                  Department: ER  MRN:        [id number]                      Room:        04  Gender:     Male                         Technician:  :        1955                   Requested By:JABIER BUITRAGO  Order #:    [id number]                    Reading MD:    Measurements  Intervals                                Axis  Rate:       89                           P:  TX:                                      QRS:        -52  QRSD:       180                          T:          115  QT:         456  QTc:        555    Interpretive Statements  Accelerated junctional rhythm  Nonspecific IVCD with LAD  LVH with secondary repolarization abnormality  Compared to ECG 2023 00:37:59  Accelerated junctional rhythm now present  Intraventricular conduction delay now present  Left ventricular hypertrophy now present  Early repolarization now present  Sinus rhythm no longer present  Sinus  pause or arrest no longer present  Short TX interval no longer present           CRITICAL CARE  The very real possibilty of a deterioration of this patient's condition required the highest level of my preparedness for sudden, emergent intervention.  I provided critical care services, which included medication orders, frequent reevaluations of the patient's condition and response to treatment, ordering and reviewing test results, and discussing the case with various consultants.  The  critical care time associated with the care of the patient was 30 minutes. Review chart for interventions. This time is exclusive of any other billable procedures.       COURSE & MEDICAL DECISION MAKING    ED Observation Status? No; Patient does not meet criteria for ED Observation.     INITIAL ASSESSMENT, COURSE AND PLAN  Care Narrative: 67-year-old male presented emerged part with inferior STEMI.  Patient was treated prior to departure.  I have discussed the case with the cardiologist Dr. Chau prior to patient arrival.  It is agreed upon that if the patient is pain-free with improved EKG findings that the patient will go to Cath Lab in the morning rather than emergently this evening.    After initial EKG obtained this has been sent to the cardiologist and reviewed.  Given the improvement we will defer Cath Lab at this time ( 00 52).     00 55: Intensivist paged    2484: Nursing tech staff repeated EKG once patient was placed in red pod awaiting intensivist callback.  Patient currently in accelerated intraventricular rhythm.        DISPOSITION AND DISCUSSIONS  I have discussed management of the patient with the following physicians and TORITO's: Dr. Hyde from cardiology and intensivist Dr. Olguin    Discussion of management with other [facility] or appropriate source(s): Pharmacy for stemi medications      67-year-old male presenting to the emergency department as a transfer from [facility] where the patient was identified of having an inferior STEMI.  Patient received aspirin, Plavix, heparin, TNK.    Please see additional history as above.    Ongoing conversation with cardiology and intensivist while patient remained here in the ER.  At this point the patient will be transferred from the ER to the ICU with likely cardiac catheterization to be completed in the early morning.    FINAL DIAGNOSIS  Inferior STEMI  History of hyperlipidemia  History of hypothyroidism    Electronically signed by: Ezekiel York M.D.,  3/6/2023 12:56 AM

## 2023-03-06 NOTE — ED TRIAGE NOTES
"Chief Complaint   Patient presents with    Chest Pain     STEMI transfer from [place]   .    Pt BIB [facility] as STEMI transfer.  Door time 0035      CP with shoveling started 3/5 @1600, patient had some pVT at outlying facility      Outside facility medications:  324 ASA   5000 units heparin IV 3/5 2154  Heparin drip initiated @ 8.3 units/kg/hour  50 mg TNK 3/5 2232  500 mL NS bolus     No neurologic deficits noted, MCKEON equally, A&Ox4    /72   Pulse 90   Resp 20   Ht 1.93 m (6' 4\")   Wt 120 kg (264 lb 8.8 oz)   SpO2 96%   BMI 32.20 kg/m² .    "

## 2023-03-06 NOTE — PROGRESS NOTES
Patient brought down to cath lab by Katerina AHMADI, no complaints of chest pain, heparin paused by cath lab RN prior to cath lab.

## 2023-03-06 NOTE — PROGRESS NOTES
Med rec completed per patient at bedside.  Allergies reviewed with patient. NKDA.  No outpatient antibiotics within the last 30 days.  Patient's home pharmacy: [facility] in [place], [place].

## 2023-03-07 PROBLEM — I47.20 V-TACH (HCC): Status: RESOLVED | Noted: 2023-03-05 | Resolved: 2023-03-07

## 2023-03-07 PROBLEM — E66.9 OBESITY (BMI 30.0-34.9): Status: ACTIVE | Noted: 2023-03-07

## 2023-03-07 PROBLEM — I21.3 STEMI (ST ELEVATION MYOCARDIAL INFARCTION) (HCC): Status: RESOLVED | Noted: 2023-03-06 | Resolved: 2023-03-07

## 2023-03-07 PROBLEM — E87.29 HIGH ANION GAP METABOLIC ACIDOSIS: Status: RESOLVED | Noted: 2023-03-06 | Resolved: 2023-03-07

## 2023-03-07 LAB
ALBUMIN SERPL BCP-MCNC: 3.6 G/DL (ref 3.2–4.9)
ALBUMIN/GLOB SERPL: 1.6 G/DL
ALP SERPL-CCNC: 54 U/L (ref 30–99)
ALT SERPL-CCNC: 74 U/L (ref 2–50)
ANION GAP SERPL CALC-SCNC: 11 MMOL/L (ref 7–16)
AST SERPL-CCNC: 310 U/L (ref 12–45)
BILIRUB SERPL-MCNC: 0.9 MG/DL (ref 0.1–1.5)
BUN SERPL-MCNC: 18 MG/DL (ref 8–22)
CALCIUM ALBUM COR SERPL-MCNC: 9.2 MG/DL (ref 8.5–10.5)
CALCIUM SERPL-MCNC: 8.9 MG/DL (ref 8.5–10.5)
CHLORIDE SERPL-SCNC: 104 MMOL/L (ref 96–112)
CHOLEST SERPL-MCNC: 188 MG/DL (ref 100–199)
CO2 SERPL-SCNC: 22 MMOL/L (ref 20–33)
CREAT SERPL-MCNC: 0.77 MG/DL (ref 0.5–1.4)
ERYTHROCYTE [DISTWIDTH] IN BLOOD BY AUTOMATED COUNT: 45.4 FL (ref 35.9–50)
GFR SERPLBLD CREATININE-BSD FMLA CKD-EPI: 97 ML/MIN/1.73 M 2
GLOBULIN SER CALC-MCNC: 2.3 G/DL (ref 1.9–3.5)
GLUCOSE BLD STRIP.AUTO-MCNC: 113 MG/DL (ref 65–99)
GLUCOSE BLD STRIP.AUTO-MCNC: 118 MG/DL (ref 65–99)
GLUCOSE BLD STRIP.AUTO-MCNC: 90 MG/DL (ref 65–99)
GLUCOSE BLD STRIP.AUTO-MCNC: 99 MG/DL (ref 65–99)
GLUCOSE SERPL-MCNC: 111 MG/DL (ref 65–99)
HCT VFR BLD AUTO: 40 % (ref 42–52)
HDLC SERPL-MCNC: 45 MG/DL
HGB BLD-MCNC: 13.4 G/DL (ref 14–18)
LDLC SERPL CALC-MCNC: 117 MG/DL
MCH RBC QN AUTO: 31.1 PG (ref 27–33)
MCHC RBC AUTO-ENTMCNC: 33.5 G/DL (ref 33.7–35.3)
MCV RBC AUTO: 92.8 FL (ref 81.4–97.8)
PLATELET # BLD AUTO: 200 K/UL (ref 164–446)
PMV BLD AUTO: 9.4 FL (ref 9–12.9)
POTASSIUM SERPL-SCNC: 4.3 MMOL/L (ref 3.6–5.5)
PROT SERPL-MCNC: 5.9 G/DL (ref 6–8.2)
RBC # BLD AUTO: 4.31 M/UL (ref 4.7–6.1)
SODIUM SERPL-SCNC: 137 MMOL/L (ref 135–145)
TRIGL SERPL-MCNC: 131 MG/DL (ref 0–149)
WBC # BLD AUTO: 9.4 K/UL (ref 4.8–10.8)

## 2023-03-07 PROCEDURE — 700102 HCHG RX REV CODE 250 W/ 637 OVERRIDE(OP): Performed by: NURSE PRACTITIONER

## 2023-03-07 PROCEDURE — 80061 LIPID PANEL: CPT

## 2023-03-07 PROCEDURE — A9270 NON-COVERED ITEM OR SERVICE: HCPCS | Performed by: EMERGENCY MEDICINE

## 2023-03-07 PROCEDURE — 700102 HCHG RX REV CODE 250 W/ 637 OVERRIDE(OP): Performed by: EMERGENCY MEDICINE

## 2023-03-07 PROCEDURE — 99223 1ST HOSP IP/OBS HIGH 75: CPT | Performed by: HOSPITALIST

## 2023-03-07 PROCEDURE — 80053 COMPREHEN METABOLIC PANEL: CPT

## 2023-03-07 PROCEDURE — A9270 NON-COVERED ITEM OR SERVICE: HCPCS | Performed by: INTERNAL MEDICINE

## 2023-03-07 PROCEDURE — 82962 GLUCOSE BLOOD TEST: CPT | Mod: 91

## 2023-03-07 PROCEDURE — 700105 HCHG RX REV CODE 258: Performed by: EMERGENCY MEDICINE

## 2023-03-07 PROCEDURE — 700102 HCHG RX REV CODE 250 W/ 637 OVERRIDE(OP): Performed by: INTERNAL MEDICINE

## 2023-03-07 PROCEDURE — 700111 HCHG RX REV CODE 636 W/ 250 OVERRIDE (IP): Performed by: INTERNAL MEDICINE

## 2023-03-07 PROCEDURE — 99232 SBSQ HOSP IP/OBS MODERATE 35: CPT | Mod: FS | Performed by: INTERNAL MEDICINE

## 2023-03-07 PROCEDURE — 770020 HCHG ROOM/CARE - TELE (206)

## 2023-03-07 PROCEDURE — 700111 HCHG RX REV CODE 636 W/ 250 OVERRIDE (IP): Performed by: EMERGENCY MEDICINE

## 2023-03-07 PROCEDURE — A9270 NON-COVERED ITEM OR SERVICE: HCPCS | Performed by: NURSE PRACTITIONER

## 2023-03-07 PROCEDURE — 85027 COMPLETE CBC AUTOMATED: CPT

## 2023-03-07 RX ORDER — METOPROLOL SUCCINATE 25 MG/1
25 TABLET, EXTENDED RELEASE ORAL NIGHTLY
Qty: 30 TABLET | Refills: 3 | Status: SHIPPED | OUTPATIENT
Start: 2023-03-07

## 2023-03-07 RX ORDER — ASPIRIN 81 MG/1
81 TABLET ORAL DAILY
Qty: 30 TABLET | Refills: 3 | Status: SHIPPED | OUTPATIENT
Start: 2023-03-08

## 2023-03-07 RX ORDER — LISINOPRIL 5 MG/1
2.5 TABLET ORAL
Status: DISCONTINUED | OUTPATIENT
Start: 2023-03-07 | End: 2023-03-08 | Stop reason: HOSPADM

## 2023-03-07 RX ORDER — LISINOPRIL 2.5 MG/1
2.5 TABLET ORAL DAILY
Qty: 30 TABLET | Refills: 3 | Status: SHIPPED | OUTPATIENT
Start: 2023-03-08

## 2023-03-07 RX ORDER — METOPROLOL SUCCINATE 25 MG/1
25 TABLET, EXTENDED RELEASE ORAL NIGHTLY
Status: DISCONTINUED | OUTPATIENT
Start: 2023-03-07 | End: 2023-03-08 | Stop reason: HOSPADM

## 2023-03-07 RX ORDER — CLOPIDOGREL BISULFATE 75 MG/1
75 TABLET ORAL DAILY
Qty: 30 TABLET | Refills: 3 | Status: SHIPPED | OUTPATIENT
Start: 2023-03-08

## 2023-03-07 RX ORDER — ATORVASTATIN CALCIUM 80 MG/1
80 TABLET, FILM COATED ORAL EVERY EVENING
Qty: 30 TABLET | Refills: 3 | Status: SHIPPED | OUTPATIENT
Start: 2023-03-08

## 2023-03-07 RX ADMIN — METOPROLOL TARTRATE 12.5 MG: 25 TABLET, FILM COATED ORAL at 05:26

## 2023-03-07 RX ADMIN — METOPROLOL SUCCINATE 25 MG: 25 TABLET, EXTENDED RELEASE ORAL at 20:16

## 2023-03-07 RX ADMIN — ENOXAPARIN SODIUM 40 MG: 40 INJECTION SUBCUTANEOUS at 17:24

## 2023-03-07 RX ADMIN — LEVOTHYROXINE SODIUM 75 MCG: 0.07 TABLET ORAL at 05:26

## 2023-03-07 RX ADMIN — CLOPIDOGREL BISULFATE 75 MG: 75 TABLET ORAL at 05:27

## 2023-03-07 RX ADMIN — SENNOSIDES AND DOCUSATE SODIUM 2 TABLET: 50; 8.6 TABLET ORAL at 17:24

## 2023-03-07 RX ADMIN — ASPIRIN 81 MG: 81 TABLET, COATED ORAL at 05:27

## 2023-03-07 RX ADMIN — THIAMINE HYDROCHLORIDE 100 MG: 100 INJECTION, SOLUTION INTRAMUSCULAR; INTRAVENOUS at 05:28

## 2023-03-07 RX ADMIN — ATORVASTATIN CALCIUM 80 MG: 80 TABLET, FILM COATED ORAL at 17:24

## 2023-03-07 RX ADMIN — LISINOPRIL 2.5 MG: 5 TABLET ORAL at 11:36

## 2023-03-07 ASSESSMENT — ENCOUNTER SYMPTOMS
SPUTUM PRODUCTION: 0
WEIGHT LOSS: 0
FEVER: 0
HALLUCINATIONS: 0
PHOTOPHOBIA: 0
PALPITATIONS: 0
BLOOD IN STOOL: 0
COUGH: 0
NECK PAIN: 0
HEARTBURN: 0
NAUSEA: 0
SINUS PAIN: 0
BLURRED VISION: 0
VOMITING: 0
DOUBLE VISION: 0
NERVOUS/ANXIOUS: 0
FOCAL WEAKNESS: 0
ABDOMINAL PAIN: 0
ORTHOPNEA: 0
HEADACHES: 0
MYALGIAS: 0
TREMORS: 0
CHILLS: 0
DIZZINESS: 0
SORE THROAT: 0
HEMOPTYSIS: 0
SHORTNESS OF BREATH: 0
TINGLING: 0
SPEECH CHANGE: 0
DEPRESSION: 0
BRUISES/BLEEDS EASILY: 0
SENSORY CHANGE: 0

## 2023-03-07 ASSESSMENT — COGNITIVE AND FUNCTIONAL STATUS - GENERAL
SUGGESTED CMS G CODE MODIFIER MOBILITY: CH
DAILY ACTIVITIY SCORE: 24
SUGGESTED CMS G CODE MODIFIER DAILY ACTIVITY: CH
MOBILITY SCORE: 24

## 2023-03-07 ASSESSMENT — PAIN DESCRIPTION - PAIN TYPE
TYPE: ACUTE PAIN

## 2023-03-07 ASSESSMENT — FIBROSIS 4 INDEX: FIB4 SCORE: 12.25

## 2023-03-07 ASSESSMENT — LIFESTYLE VARIABLES: SUBSTANCE_ABUSE: 0

## 2023-03-07 ASSESSMENT — PATIENT HEALTH QUESTIONNAIRE - PHQ9
1. LITTLE INTEREST OR PLEASURE IN DOING THINGS: NOT AT ALL
2. FEELING DOWN, DEPRESSED, IRRITABLE, OR HOPELESS: NOT AT ALL
SUM OF ALL RESPONSES TO PHQ9 QUESTIONS 1 AND 2: 0

## 2023-03-07 NOTE — CONSULTS
Hospital Medicine Consultation    Date of Service  3/7/2023    Referring Physician  Joe Patton M.D.    Consulting Physician  Luis Wooten D.O.    Reason for Consultation  Transfer of care out of ICU with new STEMI this admit    History of Presenting Illness  Albert Fu is a 67 y.o. male who presented 3/6/2023 with inferior STEMI on EKG at [facility], given lytics with improved chest pain, and transferred to [facility].     On 3/6 she had cardiac cath with BAN to the RCA with recommendation for CABG due to diffuse nature of proximal and mid LAD.    3/7 Discussed lifestyle modifications for health benefits and importance of medication compliance. He is aware he needs to contact [facility] and have prompt follow up with his primary and to establish with a cardiologist ASA for evaluation for CABG vs staged PCI.     Review of Systems  Review of Systems   Constitutional:  Negative for malaise/fatigue.   Respiratory:  Negative for cough and shortness of breath.    Cardiovascular:  Negative for chest pain.   Gastrointestinal:  Negative for abdominal pain and nausea.   Neurological:  Negative for dizziness and headaches.   Psychiatric/Behavioral:  Negative for substance abuse. The patient is not nervous/anxious.      Past Medical History   has a past medical history of Coronary artery disease due to lipid rich plaque (3/5/2023), Hyperlipidemia, and Hypothyroidism.    Surgical History   has a past surgical history that includes other orthopedic surgery.    Family History  family history includes Heart Disease in his mother. Mother  from ESRD and was on HD age 74.  Father he is not completely sure about.    Social History   reports that he has never smoked. He has never used smokeless tobacco. He reports current alcohol use. He reports that he does not use drugs. Retired from construction.  Lives in [place], [place] but has a place in [place] too.  Has kids.  .    Medications  Current  Facility-Administered Medications   Medication Dose Route Frequency Provider Last Rate Last Admin    lisinopril (PRINIVIL) tablet 2.5 mg  2.5 mg Oral Q DAY IVÁN Alejo   2.5 mg at 03/07/23 1136    metoprolol SR (TOPROL XL) tablet 25 mg  25 mg Oral Nightly IVÁN Alejo        clopidogrel (PLAVIX) tablet 75 mg  75 mg Oral DAILY Fracisco Olguin M.D.   75 mg at 03/07/23 0527    atorvastatin (LIPITOR) tablet 80 mg  80 mg Oral Q EVENING Fracisco Olguin M.D.   80 mg at 03/07/23 1724    senna-docusate (PERICOLACE or SENOKOT S) 8.6-50 MG per tablet 2 Tablet  2 Tablet Oral BID Fracisco Olguin M.D.   2 Tablet at 03/07/23 1724    And    polyethylene glycol/lytes (MIRALAX) PACKET 1 Packet  1 Packet Oral QDAY PRN Fracisco Olguin M.D.        And    magnesium hydroxide (MILK OF MAGNESIA) suspension 30 mL  30 mL Oral QDAY PRN Fracisco Olguin M.D.        And    bisacodyl (DULCOLAX) suppository 10 mg  10 mg Rectal QDAY PRN Fracisco Olguin M.D.        hydrALAZINE (APRESOLINE) injection 10 mg  10 mg Intravenous Q4HRS PRN Fracisco Olguin M.D.        insulin regular (HumuLIN R,NovoLIN R) injection  1-6 Units Subcutaneous Q6HRS Fracisco Olguin M.D.        And    dextrose 10 % BOLUS 25 g  25 g Intravenous Q15 MIN PRN Fracisco Olguin M.D.        levothyroxine (SYNTHROID) tablet 75 mcg  75 mcg Oral AM ES Fracisco Olguin M.D.   75 mcg at 03/07/23 0526    thiamine (B-1) IVPB 100 mg in 100 mL D5W (premix)  100 mg Intravenous DAILY Fracisco Olguin M.D.   Stopped at 03/07/23 0558    acetaminophen (Tylenol) tablet 650 mg  650 mg Oral Q6HRS PRN Messi Del Castillo M.D.        aspirin EC (ECOTRIN) tablet 81 mg  81 mg Oral DAILY Messi Del Castillo M.D.   81 mg at 03/07/23 0527    enoxaparin (Lovenox) inj 40 mg  40 mg Subcutaneous DAILY AT 1800 Joe Patton M.D.   40 mg at 03/07/23 1724       Allergies  No Known Allergies    Physical Exam  Temp:  [36 °C (96.8 °F)-37 °C (98.6 °F)] 36.8 °C  (98.3 °F)  Pulse:  [53-74] 56  Resp:  [18-28] 18  BP: ()/(54-76) 125/76  SpO2:  [89 %-95 %] 94 %    Physical Exam  Vitals reviewed.   Constitutional:       Appearance: Normal appearance. He is obese. He is not diaphoretic.   HENT:      Head: Normocephalic and atraumatic.      Nose: Nose normal.      Mouth/Throat:      Mouth: Mucous membranes are moist.      Pharynx: No oropharyngeal exudate.   Eyes:      General: No scleral icterus.        Right eye: No discharge.         Left eye: No discharge.      Extraocular Movements: Extraocular movements intact.      Conjunctiva/sclera: Conjunctivae normal.   Cardiovascular:      Rate and Rhythm: Normal rate and regular rhythm.      Pulses:           Radial pulses are 2+ on the right side and 2+ on the left side.        Dorsalis pedis pulses are 2+ on the right side and 2+ on the left side.      Heart sounds: No murmur heard.  Pulmonary:      Effort: Pulmonary effort is normal. No respiratory distress.      Breath sounds: Normal breath sounds. No wheezing or rales.   Abdominal:      General: Bowel sounds are normal. There is no distension.      Palpations: Abdomen is soft.      Tenderness: There is no abdominal tenderness.   Musculoskeletal:         General: No swelling or tenderness.      Cervical back: No muscular tenderness.      Right lower leg: No edema.      Left lower leg: No edema.   Lymphadenopathy:      Cervical: No cervical adenopathy.   Skin:     Coloration: Skin is not jaundiced or pale.   Neurological:      General: No focal deficit present.      Mental Status: He is alert and oriented to person, place, and time. Mental status is at baseline.      Cranial Nerves: No cranial nerve deficit.   Psychiatric:         Mood and Affect: Mood normal.         Behavior: Behavior normal.       Fluids      Laboratory  Recent Labs     03/05/23  2145 03/06/23  0041 03/07/23  0315   WBC 16.5* 14.4* 9.4   RBC 5.52 5.14 4.31*   HEMOGLOBIN 16.9 15.7 13.4*   HEMATOCRIT 49.3  46.0 40.0*   MCV 89.3 89.5 92.8   MCH 30.6 30.5 31.1   MCHC 34.3 34.1 33.5*   RDW 12.4 41.9 45.4   PLATELETCT 261 231 200   MPV 8.9 9.2 9.4     Recent Labs     03/06/23  0520 03/06/23  1149 03/07/23  0315   SODIUM 134* 135 137   POTASSIUM 4.3 3.9 4.3   CHLORIDE 103 104 104   CO2 19* 22 22   GLUCOSE 130* 133* 111*   BUN 15 16 18   CREATININE 0.58 0.64 0.77   CALCIUM 8.9 8.9 8.9     Recent Labs     03/05/23  2150 03/06/23  0041   APTT 25.9 53.7*   INR 1.00 1.14*          Recent Labs     03/07/23  0315   TRIGLYCERIDE 131   HDL 45   *        Imaging  EC-ECHOCARDIOGRAM COMPLETE W/ CONT   Final Result      DX-CHEST-PORTABLE (1 VIEW)   Final Result         1.  Left basilar atelectasis, no focal infiltrate      CL-LEFT HEART CATHETERIZATION WITH POSSIBLE INTERVENTION    (Results Pending)       Assessment/Plan  * STEMI (ST elevation myocardial infarction) (HCC)- (present on admission)  Assessment & Plan  Atorvastatin 80mg q HS, lisinopril 2.5mg daily, metoprolol SR 25mg q HS  Monitor telemetry   3/6 echocardiogram:  CONCLUSIONS  Mildly reduced left ventricular systolic function. The left ventricular   ejection fraction is visually estimated to be 45%. Mid inferior and   basal inferior wall hypokinesis.  Normal right ventricular size and systolic function.  Normal left atrial size.  Mild tricuspid regurgitation.  No pericardial effusion.    Obesity (BMI 30.0-34.9)  Assessment & Plan  Body mass index is 31.67 kg/m².  Encouraged dietary restriction on food amounts and recommend healthy heart diets.    Coronary artery disease due to lipid rich plaque- (present on admission)  Assessment & Plan  Lifestyle modifications for lower cholesterol diet and increase in activity  Atorvastatin 80mg for high intensity statin therapy  Follow up with is PCP for 6 month lipid check  Lab Results   Component Value Date/Time    CHOLSTRLTOT 188 03/07/2023 03:15 AM     (H) 03/07/2023 03:15 AM    HDL 45 03/07/2023 03:15 AM     TRIGLYCERIDE 131 03/07/2023 03:15 AM         V-tach- (present on admission)  Assessment & Plan  Metoprolol SR 25mg q HS  Monitor on tele    Hypothyroid  Assessment & Plan  Continue active treatment with levothyroxine 75mcg daily  3/6 TSH:5.08

## 2023-03-07 NOTE — CARE PLAN
The patient is Watcher - Medium risk of patient condition declining or worsening    Shift Goals  Clinical Goals: Hemodynamic stability  Patient Goals: Get better  Family Goals: RAYMOND    Progress made toward(s) clinical / shift goals:    Problem: Knowledge Deficit - Standard  Goal: Patient and family/care givers will demonstrate understanding of plan of care, disease process/condition, diagnostic tests and medications  Outcome: Progressing     Problem: Skin Integrity  Goal: Skin integrity is maintained or improved  Outcome: Progressing     Problem: Acute Care of the Cardiac Cath Patient  Goal: Pre Procedure Optimal Outcome for the Cardiac Cath Patient  Outcome: Progressing     Problem: Pain  Goal: Alleviation of Pain or a reduction in pain to the patient's comfort goal  Outcome: Progressing       Patient is not progressing towards the following goals:

## 2023-03-07 NOTE — CARE PLAN
The patient is Stable - Low risk of patient condition declining or worsening    Shift Goals  Clinical Goals: hemodynamic stability  Patient Goals: get better  Family Goals: jennifer    Progress made toward(s) clinical / shift goals:    Problem: Knowledge Deficit - Standard  Goal: Patient and family/care givers will demonstrate understanding of plan of care, disease process/condition, diagnostic tests and medications  Outcome: Progressing     Problem: Skin Integrity  Goal: Skin integrity is maintained or improved  Outcome: Progressing     Problem: Pain  Goal: Alleviation of Pain or a reduction in pain to the patient's comfort goal  Outcome: Progressing       Patient is not progressing towards the following goals:

## 2023-03-07 NOTE — DIETARY
Nutrition Services: Cardiac diet Education Consult   Day 1 of admit.  Albert Fu is a 67 y.o. male with admitting DX of STEMI (ST elevation myocardial infarction) (HCC) [I21.3]    RD able to visit pt at bedside to provide Cardiac diet education. RD discussed a heart healthy diet, provided handout reinforcing topics discussed. Pt demonstrated readiness and evidence of learning. RD able to answer all questions to patient's satisfaction.     No other education needs identified at this time. Consider referral to outpatient nutrition services for continuation of education as indicated or per pt preferences.     Please re-consult RD as indicated.

## 2023-03-07 NOTE — CARE PLAN
The patient is Watcher - Medium risk of patient condition declining or worsening    Shift Goals  Clinical Goals: hemodynamic stability  Patient Goals: get better  Family Goals: jennifer    Progress made toward(s) clinical / shift goals:      Problem: Knowledge Deficit - Standard  Goal: Patient and family/care givers will demonstrate understanding of plan of care, disease process/condition, diagnostic tests and medications  Outcome: Progressing     Problem: Skin Integrity  Goal: Skin integrity is maintained or improved  Outcome: Progressing     Problem: Pain  Goal: Alleviation of Pain or a reduction in pain to the patient's comfort goal  Outcome: Progressing

## 2023-03-07 NOTE — PROGRESS NOTES
[facility] Cardiology Follow-up Note    Date of Service:    3/7/2023      Name:   Albert Fu   YOB: 1955  Age:   67 y.o.  male   MRN:   [id number]      Reason for cardiology consult: inferior STEMI    Attending Provider: Dr Wooten    HPI:  Mr Fu is a 68 yo male with hx of HLD who presented to [facility]  with inferior STEMI presented to [facility] on 3/5/23 with EKG showing inferior STEMI, given TNK, improved CP and ST segments.     Was taken to Cath Lab yesterday morning and when she received PCI to culprit mid RCA, with residual diffuse high-grade disease at proximal-mid LAD 80%, and  nondominant LCx with high-grade OM1 disease.    Interim Events:  3/7/23  - Personal Telemetry interpretation: SB/SR 50-70's, frequent PACs, occasional PVCs  - Patient up in chair, ambulating comfortably in the room  - Vitals: -120's, room air  - Labs reviewed: AST//74, , trop max 3300    ROS  Constitutional: denies fatigue.  Respiratory:  Denies shortness of breath, no cough.  Cardiovascular: denies chest pain.  denies lower extremity edema.  Denies orthopnea or PND.  : denies polyuria, no dysuria.  GI:  Denies nausea/vomiting.  No abdominal distention.  Neuro:  Denies dizziness, syncope.  Hem/lymph: Denies easy bleeding/bruising.      All other review of systems reviewed and negative.    Past medical, surgical, social, and family history reviewed and unchanged from admission except as noted in HPI.    Medications: Reviewed in MAR  Current Facility-Administered Medications   Medication Dose Frequency Provider Last Rate Last Admin    lisinopril (PRINIVIL) tablet 2.5 mg  2.5 mg Q DAY IVÁN Alejo   2.5 mg at 03/07/23 1136    clopidogrel (PLAVIX) tablet 75 mg  75 mg DAILY Fracisco Olguin M.D.   75 mg at 03/07/23 0527    atorvastatin (LIPITOR) tablet 80 mg  80 mg Q EVENING Fracisco Olguin M.D.   80 mg at 03/06/23 1706    senna-docusate (PERICOLACE or SENOKOT S) 8.6-50 MG per  "tablet 2 Tablet  2 Tablet BID Fracisco Olguin M.D.   2 Tablet at 03/06/23 1706    And    polyethylene glycol/lytes (MIRALAX) PACKET 1 Packet  1 Packet QDAY PRN Fracisco Olguin M.D.        And    magnesium hydroxide (MILK OF MAGNESIA) suspension 30 mL  30 mL QDAY PRN Fracisco Olguin M.D.        And    bisacodyl (DULCOLAX) suppository 10 mg  10 mg QDAY PRN Fracisco Olguin M.D.        hydrALAZINE (APRESOLINE) injection 10 mg  10 mg Q4HRS PRN Fracisco Olguin M.D.        insulin regular (HumuLIN R,NovoLIN R) injection  1-6 Units Q6HRS Fracisco Olguin M.D.        And    dextrose 10 % BOLUS 25 g  25 g Q15 MIN PRN Fracisco Olguin M.D.        levothyroxine (SYNTHROID) tablet 75 mcg  75 mcg AM ES Fracisco Olguin M.D.   75 mcg at 03/07/23 0526    thiamine (B-1) IVPB 100 mg in 100 mL D5W (premix)  100 mg DAILY Fracisco Olguin M.D.   Stopped at 03/07/23 0558    acetaminophen (Tylenol) tablet 650 mg  650 mg Q6HRS PRN Messi Del Castillo M.D.        aspirin EC (ECOTRIN) tablet 81 mg  81 mg DAILY Messi Del Castillo M.D.   81 mg at 03/07/23 0527    metoprolol tartrate (LOPRESSOR) tablet 12.5 mg  12.5 mg TWICE DAILY Joe Patton M.D.   12.5 mg at 03/07/23 0526    enoxaparin (Lovenox) inj 40 mg  40 mg DAILY AT 1800 Joe Patton M.D.   40 mg at 03/06/23 1706   Last reviewed on 3/6/2023 11:48 AM by Hallie Jacobson   No Known Allergies    Physical Exam  Body mass index is 31.67 kg/m². /74   Pulse (!) 58   Temp 36.8 °C (98.3 °F) (Temporal)   Resp 18   Ht 1.93 m (6' 4\")   Wt 118 kg (260 lb 2.3 oz)   SpO2 95%    Vitals:    03/07/23 0900 03/07/23 1000 03/07/23 1100 03/07/23 1200   BP: 113/60 111/67 118/69 129/74   Pulse: (!) 57 (!) 56 (!) 56 (!) 58   Resp: 18 18 20 18   Temp:    36.8 °C (98.3 °F)   TempSrc:    Temporal   SpO2: 93% 94% 95% 95%   Weight:       Height:        Oxygen Therapy:  Pulse Oximetry: 95 %, O2 Delivery Device: None - Room Air    General: no acute distress, well- " appearing  Neck: No JVD, no bruits  Lungs: CTAB, normal effort. no wheezing, rales, or rhonchi  Heart: RRR, normal S1 /S2, no murmur, no rub  EXT: No lower extremity edema, 2+ radial pulses. 2+ pedal pulses.  R radial cath site clean, soft, no hematoma, paresthesias, or pain.  Abdomen: soft, non tender, non distended  Neurological: No focal deficits, no facial asymmetry.  Normal speech  Psychiatric: Appropriate affect, alert and oriented x 3  Skin: Warm and dry extremities, no rashes    Labs (personally reviewed):     Lab Results   Component Value Date/Time    SODIUM 137 03/07/2023 03:15 AM    POTASSIUM 4.3 03/07/2023 03:15 AM    CHLORIDE 104 03/07/2023 03:15 AM    CO2 22 03/07/2023 03:15 AM    GLUCOSE 111 (H) 03/07/2023 03:15 AM    BUN 18 03/07/2023 03:15 AM    CREATININE 0.77 03/07/2023 03:15 AM     Lab Results   Component Value Date/Time    ALKPHOSPHAT 54 03/07/2023 03:15 AM    ASTSGOT 310 (H) 03/07/2023 03:15 AM    ALTSGPT 74 (H) 03/07/2023 03:15 AM    TBILIRUBIN 0.9 03/07/2023 03:15 AM      Lab Results   Component Value Date/Time    CHOLSTRLTOT 188 03/07/2023 03:15 AM     (H) 03/07/2023 03:15 AM    HDL 45 03/07/2023 03:15 AM    TRIGLYCERIDE 131 03/07/2023 03:15 AM       Cardiac Imaging and Procedures Review:      EKG 3/6/23: My Personal interpretation reveals SB 57, Inferior ST elevation still present    Echo 3/6/23:  Mildly reduced left ventricular systolic function. The left ventricular   ejection fraction is visually estimated to be 45%. Mid inferior and   basal inferior wall hypokinesis.  Normal right ventricular size and systolic function.  Normal left atrial size.  Mild tricuspid regurgitation.  No pericardial effusion.     No prior study is available for comparison.     [facility] 3/6/23  POSTOPERATIVE DIAGNOSIS:  70% hazy culprit mid RCA disease   nondominant LCx with high-grade OM1 disease  Diffuse high-grade proximal and mid LAD disease up to 80% stenosis.  Large distal vessel.  LVEF 60%, LVEDP 11  mmHg  Successful PCI culprit RCA (3.75 x 20 mm Synergy BAN), excellent result     RECOMMENDATIONS:  Guideline directed medical therapy   Cardiovascular Risk factor modification  Dual antiplatelet therapy for minimum 1 year  Staged revascularization of LAD plus minus OM.  Recommend consideration of CABG due to diffuse nature of the proximal and mid LAD disease.  PCI would be reasonable however the  of the LCx is moderately unfavorable for PCI.    Assessment and Medical Decision Making:    CAD, STEMI, s/p BAN/PCI to mid RCA  Guideline directed medical therapy to include   - EF 45%  - Continue DAPT x 1 year  - Continue aspirin 81 mg daily  - Continue clopidogrel 75 mg daily  - Continue Metoprolol, switch to SR 25mg nightly   - High intensity Statin, atorva 80mg daily  - Add low-dose ACE, lisinopril 2.5 mg daily given lower BP  - Wrist precautions discussed  - Will need to establish with cardiologist is RADHA Infante, further discuss options of staged PCI versus CABG  - Intensive Cardiac rehab referral   - Discussed worsening symptoms of chest pain, patient to go the emergency room      Thank you for allowing me to participate in this patients care.  Please contact me with any questions or concerns.  Patient should be cleared to discharge tomorrow to beds x1 month.    Please see Dr. Ruiz's attestation for additions and further recommendations.    I personally spent a total of 17 minutes which includes face-to-face time and non-face-to-face time spent on preparing to see the patient, reviewing hospital notes and tests, obtaining history from the patient, performing a medically appropriate exam, counseling and educating the patient, ordering medications/tests/procedures/referrals as clinically indicated, and documenting information in the electronic medical record.      PLEASE NOTE: Some of this dictation was created using voice recognition software. I have made every reasonable attempt to correct obvious errors, but  I expect that there are errors of grammar and possibly content that I did not discover before finalizing the note.     BETH Alejo.   [facility]  [phone]

## 2023-03-07 NOTE — PROGRESS NOTES
[facility] ICU Progress Note      Admit Date: 3/6/2023    Resident(s): Evan Palacios M.D.   Attending:  MAYURI ORTIZ/ Dr. Pooja MD    Patient ID:    Name:  Albert Fu   YOB: 1955  Age:  67 y.o.  male   MRN:  [id number]    Hospital Course (carried forward and updated):  3/6: Albert Fu is a 67 y.o. male with known past medical history Hypothyroidism and dyslipidemia. He was seen yesterday evening at [facility] for acute onset chest pain after exerting himself shoveling snow. EKG showed Inferior and posterior STEMI. He was given TNKase at around 22:30 pm and then transferred to [facility] for definitive management.  Patient is on Heparin drip and DAPT, expected to undergo PCI this morning (3/6) by Dr. Del Castillo.  This morning patient alert and oriented times 4, no distress, denied SOB or chest pain. Vital signs stable.  Stented RCA, will need staged CABG outpatient for LAD disease.     3/7: No acute events overnight reported.   Asymptomatic, unremarkable labs and normal vital signs.  ECHO showed: EF 45% with anteroseptal and inferior septal hypokinesis.    Consultants:  Critical Care  Cardiology  Interventionist Cardiologist     Interval Events:  No acute events overnight reported.  Patient slept well, no chest pain, palpitations or SOB.  Continue guided directed medical therapy.  On BB, DAPT, Statin.  Will require ACE I prior discharge from hospital.  Transfer to Telemetry.    Vitals Range last 24h:  Temp:  [36 °C (96.8 °F)-37 °C (98.6 °F)] 36.9 °C (98.4 °F)  Pulse:  [49-74] 55  Resp:  [17-38] 20  BP: ()/(50-72) 115/57  SpO2:  [89 %-95 %] 94 %      Intake/Output Summary (Last 24 hours) at 3/7/2023 0836  Last data filed at 3/7/2023 0800  Gross per 24 hour   Intake 120 ml   Output 955 ml   Net -835 ml        Review of Systems   Constitutional:  Negative for chills, fever, malaise/fatigue and weight loss.   HENT:  Negative for congestion, ear pain, sinus pain, sore throat and  tinnitus.    Eyes:  Negative for blurred vision, double vision and photophobia.   Respiratory:  Negative for cough, hemoptysis, sputum production and shortness of breath.    Cardiovascular:  Negative for chest pain, palpitations, orthopnea and leg swelling.   Gastrointestinal:  Negative for abdominal pain, blood in stool, heartburn, melena, nausea and vomiting.   Genitourinary:  Negative for dysuria, frequency, hematuria and urgency.   Musculoskeletal:  Negative for joint pain, myalgias and neck pain.   Skin:  Negative for rash.   Neurological:  Negative for dizziness, tingling, tremors, sensory change, speech change, focal weakness and headaches.   Endo/Heme/Allergies:  Does not bruise/bleed easily.   Psychiatric/Behavioral:  Negative for depression, hallucinations, substance abuse and suicidal ideas.       PHYSICAL EXAM:  Vitals:    03/07/23 0400 03/07/23 0500 03/07/23 0600 03/07/23 0800   BP: 106/60 105/56 104/63 115/57   Pulse: (!) 53 60 61 (!) 55   Resp:    20   Temp:    36.9 °C (98.4 °F)   TempSrc:    Temporal   SpO2: 92% 93% 93% 94%   Weight:       Height:        Body mass index is 31.67 kg/m².    O2 therapy: Pulse Oximetry: 94 %, O2 Delivery Device: None - Room Air    Date 03/07/23 0700 - 03/08/23 0659   Shift 6626-2279 5374-3805 9415-7519 24 Hour Total   INTAKE   Shift Total       OUTPUT   Urine 225   225     Number of Times Voided 1 x   1 x     Urine Void (mL) 225   225   Stool         Number of Times Stooled 1 x   1 x   Shift Total 225   225   NET -225   -225        Physical Exam  Vitals reviewed.   Constitutional:       General: He is not in acute distress.     Appearance: Normal appearance.   HENT:      Head: Normocephalic and atraumatic.      Nose: Nose normal. No congestion or rhinorrhea.      Mouth/Throat:      Mouth: Mucous membranes are moist.      Pharynx: Oropharynx is clear. No oropharyngeal exudate or posterior oropharyngeal erythema.   Eyes:      General: No scleral icterus.     Extraocular  Movements: Extraocular movements intact.      Conjunctiva/sclera: Conjunctivae normal.      Pupils: Pupils are equal, round, and reactive to light.   Cardiovascular:      Rate and Rhythm: Normal rate and regular rhythm.      Pulses: Normal pulses.      Heart sounds: Normal heart sounds. No murmur heard.  Pulmonary:      Effort: Pulmonary effort is normal. No respiratory distress.      Breath sounds: Normal breath sounds. No wheezing or rhonchi.   Abdominal:      General: Bowel sounds are normal. There is no distension.      Palpations: Abdomen is soft.      Tenderness: There is no abdominal tenderness. There is no guarding or rebound.   Musculoskeletal:         General: No swelling or deformity. Normal range of motion.      Cervical back: Normal range of motion and neck supple. No rigidity or tenderness.      Right lower leg: No edema.      Left lower leg: No edema.   Skin:     General: Skin is warm.      Capillary Refill: Capillary refill takes less than 2 seconds.      Coloration: Skin is not jaundiced.      Findings: No bruising or erythema.   Neurological:      General: No focal deficit present.      Mental Status: He is alert and oriented to person, place, and time.      Cranial Nerves: No cranial nerve deficit.      Sensory: No sensory deficit.      Motor: No weakness.   Psychiatric:         Mood and Affect: Mood normal.         Behavior: Behavior normal.         Thought Content: Thought content normal.           Recent Labs     03/06/23  0230 03/06/23  0520 03/06/23  1149 03/07/23  0315   SODIUM 134* 134* 135 137   POTASSIUM 4.2 4.3 3.9 4.3   CHLORIDE 102 103 104 104   CO2 16* 19* 22 22   BUN 16 15 16 18   CREATININE 0.65 0.58 0.64 0.77   MAGNESIUM 2.1 2.1  --   --    PHOSPHORUS  --  3.9  --   --    CALCIUM 9.1 8.9 8.9 8.9     Recent Labs     03/06/23  0041 03/06/23  0230 03/06/23  0520 03/06/23  1149 03/07/23  0315   ALTSGPT 41  --   --  69* 74*   ASTSGOT 79*  --   --  324* 310*   ALKPHOSPHAT 69  --   --  59  54   TBILIRUBIN 1.5  --   --  1.5 0.9   GLUCOSE 150*   < > 130* 133* 111*    < > = values in this interval not displayed.     Recent Labs     03/05/23 2145 03/05/23 2150 03/06/23 0041 03/07/23 0315   RBC 5.52  --  5.14 4.31*   HEMOGLOBIN 16.9  --  15.7 13.4*   HEMATOCRIT 49.3  --  46.0 40.0*   PLATELETCT 261  --  231 200   PROTHROMBTM  --  10.7 14.5  --    APTT  --  25.9 53.7*  --    INR  --  1.00 1.14*  --      Recent Labs     03/05/23 2145 03/06/23 0041 03/06/23  1149 03/07/23 0315   WBC 16.5* 14.4*  --  9.4   NEUTSPOLYS  --  84.70*  --   --    LYMPHOCYTES  --  10.70*  --   --    MONOCYTES  --  4.00  --   --    EOSINOPHILS  --  0.00  --   --    BASOPHILS  --  0.30  --   --    ASTSGOT 51* 79* 324* 310*   ALTSGPT 47 41 69* 74*   ALKPHOSPHAT 89 69 59 54   TBILIRUBIN 1.9* 1.5 1.5 0.9       Meds:   clopidogrel  75 mg      atorvastatin  80 mg      senna-docusate  2 Tablet      And    polyethylene glycol/lytes  1 Packet      And    magnesium hydroxide  30 mL      And    bisacodyl  10 mg      hydrALAZINE  10 mg      insulin regular  1-6 Units      And    dextrose bolus  25 g      levothyroxine  75 mcg      thiamine  100 mg Stopped (03/07/23 0558)    acetaminophen  650 mg      aspirin EC  81 mg      metoprolol tartrate  12.5 mg      enoxaparin (LOVENOX) injection  40 mg          Procedures:  PCI    Imaging:  EC-ECHOCARDIOGRAM COMPLETE W/ CONT   Final Result      DX-CHEST-PORTABLE (1 VIEW)   Final Result         1.  Left basilar atelectasis, no focal infiltrate      CL-LEFT HEART CATHETERIZATION WITH POSSIBLE INTERVENTION    (Results Pending)       ASSESSEMENT and PLAN:    * STEMI (ST elevation myocardial infarction) (HCC)- (present on admission)  Assessment & Plan  - Acute onset chest pain on 3/5/23 after shoveling snow. Seen at [facility] in [facility]    Right and Inferior STEMI he was given TNKase at 22:30 pm and transferred to [facility]  - 3/6: PCI  BAN to RCA  - 70 % mid RCA disease.  - Diffused high grade  proximal and mid LAD disease 80% stenosis.  - LVEF 60%, LVEDP 11 mmHg   - BAN to RCA  3/7: No events  overnight, asymptomatic, no complaints        Hemodynamically stable, unremarkable labs.        Needs ACE I to his medical regimen prior discharge         Plan:  - Guided medical therapy  - DAPT, BB, ACE I, Statin  - CTM  - Cardiothoracic surgery evaluation (inpatient or outpatient)  - Will need staged CABD for left hear disease  - PCP follow up      High anion gap metabolic acidosis  Assessment & Plan  - On presentation Patient with a bicarbonate of 16, elevated anion gap blood sugar 150, no history of diabetes we will check BHB  Received a fluid bolus on route, awake alert well-perfused warm without hypotension, unlikely representative of shock  - Got a 250 cc LR bolus   - Improving Bic now 19  - CTM  3/7: Resolved    Hypothyroid  Assessment & Plan  - Hx of Hypothyroidism   - On Levothyroxin  - TSH 5.08  - Continue home therapy        DISPO: Transfer to Telemetry    CODE STATUS: Full Code    Quality Measures:  Feeding: Oral diet  Analgesia: n/a  Sedation: n/a  Thromboprophylaxis: Lovenox  Head of bed: >30 degrees  Ulcer prophylaxis: n/a  Glycemic control: Correctional: n/a / Basal: n/a  Bowel care: bowel regimen: Per protocol  Indwelling lines: PIV  Deescalation of antibiotics: N/A      Evan Palacios M.D.

## 2023-03-07 NOTE — DISCHARGE INSTRUCTIONS
A - Antiplatelet - Aspirin 81 mg daily or other antiplatelets (clopidogrel (PLAVIX), prasrugrel (EFFIENT), ticagrelor (BRILINTA)) reduce your risk.  B - Blood Pressure Control - reduces your risk or heart attack and stroke.  C - Cholesterol Management - statins reduce your risk; for those that are intolerant to statins, there are alternatives.  D - Diet - Cardiac rehab diets, [email or web address].  E - Exercise - at least 5 hours of moderate exercise weekly  (typical brisk walking or similar activity).  F - Fats - VASCEPA,  or Fish oil (if Vascepa too expensive) for elevated triglycerides (REDUCE IT trial showed reduction from 22% 5 year MACE to 17%).  G - Good Vibes - meditation, exercise, yoga, mindfulness, stress reduction.  H - Heart Failure - betablockers, sucubatril (ENTRESTO) 16% less risk of dying over 3 years, spironolactone, dapagliflozin (FARXIGA) 17% less risk of dying over 2 years, CRT +/- ICD.  I - Inflammation - Colchicine in the LoDoCo2 study in 2020 reduced the risk of heart attack by 30% in 2.5 year follow up.  R - Rehab - Cardiac rehab reduces risk of dying by 13-24% and need to go to the hospital by 30% within the first year.  S - Smoking - never smoke, if you do smoke ask for help to quit for good. Patients who quit smoking after heart attack have 36% less likely risk of dying.  T - Type II Diabetes - pills empagliflozin (JARDIANCE) 38% less risk of dying over 4 years, and/or weekly injections liraglutide (Victoza), semaglutide (Ozempic), dulaglutide (Trulicity) ~26% less risk of MACE in 2 years.  V - Vaccines - Annual flu shot and COVID vaccine reduces the risk of serious cardiovascular complications from these deadly infections.  W - Weight - maintain a healthy weight.      Work on at least 1.5 - 5 hours a week of moderate exercise    Please look into the following diets and incorporate them into your diet  LOW SALT DIET   KEEP YOUR SODIUM EQUAL TO CALORIES AND NO MORE THAN DOUBLE THE CALORIES  FOR A LOW SALT DIET    Cardiosmart.org - great resource for American College of Cardiology on heart disease prevention and treatment    FOR TREATMENT OR PREVENTION OF CORONARY ARTERY DISEASE  These three programs are approved by Medicare/Insurers for those with heart disease  [place] - [facility]  Dr. Barajas's Program for Reversing Heart Disease - Lance Mendozas Cardiologist vegetarian-based  [facility] Cardiac Wellness Program - [place]-based mind-body Program    This is a commonly referenced Program  Dr Gaviria - [place] over [place] (book and documentary) - vegetarian-based    FOR TREATMENT OF BLOOD PRESSURE  DASH DIET - American Heart Association for treatment of HYPERTENSION    FOR TREATMENT OF BAD CHOLESTEROL/FATS  REDUCE PROCESSED SUGAR AS MUCH AS POSSIBLE  INCREASE WHOLE GRAINS/VEGETABLES  INCREASE FIBER    Lowering total cholesterol and LDL (bad) cholesterol:  - Eat leaner cuts of meat, or eliminate altogether if possible red meat, and frequently substitute fish or chicken.  - Limit saturated fat to no more than 7-10% of total calories no more than 10 g per day is recommended. Some sources of saturated fat include butter, animal fats, hydrogenated vegetable fats and oils, many desserts, whole milk dairy products.  - Replaced saturated fats with polyunsaturated fats and monounsaturated fats. Foods high in monounsaturated fat include nuts, canola oil, avocados, and olives.  - Limit trans fat (processed foods) and replaced with fresh fruits and vegetables  - Recommend nonfat dairy products  - Increase substantially the amount of soluble fiber intake (legumes such as beans, fruit, whole grains).  - Consider nutritional supplements: plant sterile spreads such as Benecol, fish oil,  flaxseed oil, omega-3 acids capsules 1000 mg twice a day, or viscous fiber such as Metamucil  - Attain ideal weight and regular exercise (at least 30 minutes per day of moderate exercise)  ASK ABOUT  STATIN OR NON STATIN MEDICATION TO REDUCE YOUR LDL AND HEART RISK    Lowering triglycerides:  - Reduce intake of simple sugar: Desserts, candy, pastries, honey, sodas, sugared cereals, yogurt, Gatorade, sports bars, canned fruit, smoothies, fruit juice, coffee drinks  - Reduced intake of refined starches: Refined Pasta, most bread  - Reduce or abstain from alcohol  - Increase omega-3 fatty acids: [place], Trout, Mackerel, Herring, [place] tuna and supplements  - Attain ideal weight and regular exercise (at least 30 minutes per day of moderate exercise)  ASK ABOUT PURIFIED OMEGA 3 EPA or FISH OIL TO REDUCE YOUR TG AND HEART RISK    Elevating HDL (good) cholesterol:  - Increase physical activity  - Increase omega-3 fatty acids and supplements as listed above  - Incorporating appropriate amounts of monounsaturated fats such as nuts, olive oil, canola oil, avocados, olives  - Stop smoking  - Attain ideal weight and regular exercise (at least 30 minutes per day of moderate exercise)      Discharge Instructions per [email or web address]  DIET: healthy balanced diet  ACTIVITY: as tolerates  DIAGNOSIS: Acute heart attack requiring stent placement and will need further evaluation by [facility] for possible CABG  Return to ER if needed

## 2023-03-07 NOTE — PROGRESS NOTES
Chart Check Complete    Monitor Summary:  Rhythm and Rate: SB/SA 50-70s bpm  Ectopy: PACs (f), PVCs (o)

## 2023-03-07 NOTE — PROGRESS NOTES
No ICU needs transfer to [facility] for post STEMI care    Joe Patton MD  Critical Care Medicine

## 2023-03-08 ENCOUNTER — PHARMACY VISIT (OUTPATIENT)
Dept: PHARMACY | Facility: MEDICAL CENTER | Age: 68
End: 2023-03-08
Payer: COMMERCIAL

## 2023-03-08 VITALS
HEIGHT: 76 IN | WEIGHT: 272.05 LBS | HEART RATE: 55 BPM | BODY MASS INDEX: 33.13 KG/M2 | SYSTOLIC BLOOD PRESSURE: 115 MMHG | RESPIRATION RATE: 18 BRPM | DIASTOLIC BLOOD PRESSURE: 73 MMHG | TEMPERATURE: 98.4 F | OXYGEN SATURATION: 91 %

## 2023-03-08 LAB
GLUCOSE BLD STRIP.AUTO-MCNC: 94 MG/DL (ref 65–99)
GLUCOSE BLD STRIP.AUTO-MCNC: 95 MG/DL (ref 65–99)
MAGNESIUM SERPL-MCNC: 2 MG/DL (ref 1.5–2.5)

## 2023-03-08 PROCEDURE — 83735 ASSAY OF MAGNESIUM: CPT

## 2023-03-08 PROCEDURE — 700102 HCHG RX REV CODE 250 W/ 637 OVERRIDE(OP): Performed by: EMERGENCY MEDICINE

## 2023-03-08 PROCEDURE — 700105 HCHG RX REV CODE 258: Performed by: EMERGENCY MEDICINE

## 2023-03-08 PROCEDURE — RXMED WILLOW AMBULATORY MEDICATION CHARGE: Performed by: HOSPITALIST

## 2023-03-08 PROCEDURE — 700102 HCHG RX REV CODE 250 W/ 637 OVERRIDE(OP): Performed by: NURSE PRACTITIONER

## 2023-03-08 PROCEDURE — A9270 NON-COVERED ITEM OR SERVICE: HCPCS | Performed by: NURSE PRACTITIONER

## 2023-03-08 PROCEDURE — 82962 GLUCOSE BLOOD TEST: CPT

## 2023-03-08 PROCEDURE — 700111 HCHG RX REV CODE 636 W/ 250 OVERRIDE (IP): Performed by: EMERGENCY MEDICINE

## 2023-03-08 PROCEDURE — 700102 HCHG RX REV CODE 250 W/ 637 OVERRIDE(OP): Performed by: INTERNAL MEDICINE

## 2023-03-08 PROCEDURE — A9270 NON-COVERED ITEM OR SERVICE: HCPCS | Performed by: INTERNAL MEDICINE

## 2023-03-08 PROCEDURE — 99239 HOSP IP/OBS DSCHRG MGMT >30: CPT | Performed by: HOSPITALIST

## 2023-03-08 PROCEDURE — 99232 SBSQ HOSP IP/OBS MODERATE 35: CPT | Performed by: INTERNAL MEDICINE

## 2023-03-08 PROCEDURE — A9270 NON-COVERED ITEM OR SERVICE: HCPCS | Performed by: EMERGENCY MEDICINE

## 2023-03-08 RX ADMIN — LEVOTHYROXINE SODIUM 75 MCG: 0.07 TABLET ORAL at 04:13

## 2023-03-08 RX ADMIN — ASPIRIN 81 MG: 81 TABLET, COATED ORAL at 04:13

## 2023-03-08 RX ADMIN — CLOPIDOGREL BISULFATE 75 MG: 75 TABLET ORAL at 04:14

## 2023-03-08 RX ADMIN — LISINOPRIL 2.5 MG: 5 TABLET ORAL at 04:13

## 2023-03-08 RX ADMIN — THIAMINE HYDROCHLORIDE 100 MG: 100 INJECTION, SOLUTION INTRAMUSCULAR; INTRAVENOUS at 07:45

## 2023-03-08 NOTE — PROGRESS NOTES
4 Eyes Skin Assessment Completed by GALDINO Dwyer and GALDINO Kat.    Head WDL  Ears Redness and Blanching  Nose WDL  Mouth WDL  Neck WDL  Breast/Chest WDL  Shoulder Blades WDL  Spine WDL  (R) Arm/Elbow/Hand Redness, Blanching, and Bruising  (L) Arm/Elbow/Hand Redness, Blanching, and Bruising  Abdomen WDL  Groin WDL  Scrotum/Coccyx/Buttocks WDL  (R) Leg WDL  (L) Leg WDL  (R) Heel/Foot/Toe Redness, Blanching, and Boggy  (L) Heel/Foot/Toe Redness, Blanching, and Boggy          Devices In Places Tele Box, Blood Pressure Cuff, and Pulse Ox, bilateral hearing aids      Interventions In Place Pillows and Pressure Redistribution Mattress    Possible Skin Injury No    Pictures Uploaded Into Epic N/A  Wound Consult Placed N/A  RN Wound Prevention Protocol Ordered Yes

## 2023-03-08 NOTE — ASSESSMENT & PLAN NOTE
Atorvastatin 80mg q HS, lisinopril 2.5mg daily, metoprolol SR 25mg q HS  Monitor telemetry   3/6 echocardiogram:  CONCLUSIONS  Mildly reduced left ventricular systolic function. The left ventricular   ejection fraction is visually estimated to be 45%. Mid inferior and   basal inferior wall hypokinesis.  Normal right ventricular size and systolic function.  Normal left atrial size.  Mild tricuspid regurgitation.  No pericardial effusion.

## 2023-03-08 NOTE — ASSESSMENT & PLAN NOTE
Lifestyle modifications for lower cholesterol diet and increase in activity  Atorvastatin 80mg for high intensity statin therapy  Follow up with is PCP for 6 month lipid check  Lab Results   Component Value Date/Time    CHOLSTRLTOT 188 03/07/2023 03:15 AM     (H) 03/07/2023 03:15 AM    HDL 45 03/07/2023 03:15 AM    TRIGLYCERIDE 131 03/07/2023 03:15 AM

## 2023-03-08 NOTE — PROGRESS NOTES
Meds to beds delivered. Discussed importance of checking BP, maintaining a low cholesterol and low sodium diet and taking medications as prescribed. Pt verbalized understanding.

## 2023-03-08 NOTE — PROGRESS NOTES
Monitor Summary  Rhythm: SB/SR  Rate: 46-65  Ectopy: frequent PVC, rare PAC, frequent PAC, [place] to 44  .20 / .07 / .46

## 2023-03-08 NOTE — DISCHARGE SUMMARY
Discharge Summary    CHIEF COMPLAINT ON ADMISSION  Chief Complaint   Patient presents with    Chest Pain     STEMI transfer from [place]       Reason for Admission  STEMI    Admission Date  3/6/2023    CODE STATUS  Full Code    HPI & HOSPITAL COURSE  Albert Fu is a 67 y.o. male who presented 3/6/2023 with inferior STEMI on EKG at [facility], given lytics with improved chest pain, and transferred to [facility].      On 3/6 he had cardiac cath with BAN to the RCA with recommendation for CABG vs staged PCI due to diffuse nature of proximal and mid LAD.    Patient aware of need for medication compliance and he will follow up with [place] with his primary care and to establish with a cardiologist.      Therefore, he is discharged in good and stable condition to home with close outpatient follow-up.    The patient met 2-midnight criteria for an inpatient stay at the time of discharge.    Discharge Date  03/08/23       FOLLOW UP ITEMS POST DISCHARGE  None    DISCHARGE DIAGNOSES  Principal Problem (Resolved):    STEMI (ST elevation myocardial infarction) (HCC) POA: Yes  Active Problems:    Hypothyroid POA: Unknown    Coronary artery disease due to lipid rich plaque (Chronic) POA: Yes    Obesity (BMI 30.0-34.9) POA: Unknown  Resolved Problems:    High anion gap metabolic acidosis POA: Unknown    V-tach POA: Yes      FOLLOW UP  No future appointments.  [facility]  [address]  [address]  [facility] [id number] [phone]  Call  Your doctor has referred you for Cardiac Rehab which is important in your recovery. Please call to make an appointment or speak with your local providers for programs in your area.    [facility]    Schedule an appointment as soon as possible for a visit in 2 week(s)  Recommend follow up with your primary care and to establish an appointment with cardiologist in the next month.      MEDICATIONS ON DISCHARGE     Medication List        START taking these medications         Instructions   aspirin 81 MG EC tablet   Take 1 Tablet by mouth every day.  Dose: 81 mg     atorvastatin 80 MG tablet  Commonly known as: LIPITOR   Take 1 Tablet by mouth every evening.  Dose: 80 mg     clopidogrel 75 MG Tabs  Commonly known as: PLAVIX   Take 1 Tablet by mouth every day.  Dose: 75 mg     lisinopril 2.5 MG Tabs  Commonly known as: PRINIVIL   Take 1 Tablet by mouth every day.  Dose: 2.5 mg     metoprolol SR 25 MG Tb24  Commonly known as: TOPROL XL   Take 1 Tablet by mouth every evening.  Dose: 25 mg            CONTINUE taking these medications        Instructions   B-12 SL   Place 1 Tablet under the tongue every day.  Dose: 1 Tablet     COQ-10 PO   Take 1 Capsule by mouth every day.  Dose: 1 Capsule     levothyroxine 75 MCG Tabs  Commonly known as: SYNTHROID   Take 75 mcg by mouth every morning on an empty stomach.  Dose: 75 mcg     MAGNESIUM CITRATE PO   Take 1 Tablet by mouth every day.  Dose: 1 Tablet     NAC PO   Take 1 Capsule by mouth every evening.  Dose: 1 Capsule     VITAMIN C PO   Take 1 Tablet by mouth 2 times a day.  Dose: 1 Tablet     VITAMIN D3 PO   Take 1 Tablet by mouth 2 times a day.  Dose: 1 Tablet     Zinc 50 MG Tabs   Take 50 mg by mouth every day.  Dose: 50 mg              Allergies  No Known Allergies    DIET  Orders Placed This Encounter   Procedures    Diet Order Diet: Cardiac     Standing Status:   Standing     Number of Occurrences:   1     Order Specific Question:   Diet:     Answer:   Cardiac [6]       ACTIVITY  As tolerated.  Weight bearing as tolerated    CONSULTATIONS  Cardiology    PROCEDURES  3/6/2023 Cardiac Catheterization:  POSTOPERATIVE DIAGNOSIS:  70% hazy culprit mid RCA disease   nondominant LCx with high-grade OM1 disease  Diffuse high-grade proximal and mid LAD disease up to 80% stenosis.  Large distal vessel.  LVEF 60%, LVEDP 11 mmHg  Successful PCI culprit RCA (3.75 x 20 mm Synergy BAN), excellent result     RECOMMENDATIONS:  Guideline directed  medical therapy   Cardiovascular Risk factor modification  Dual antiplatelet therapy for minimum 1 year  Staged revascularization of LAD plus minus OM.  Recommend consideration of CABG due to diffuse nature of the proximal and mid LAD disease.  PCI would be reasonable however the  of the LCx is moderately unfavorable for PCI.       LABORATORY  Lab Results   Component Value Date    SODIUM 137 03/07/2023    POTASSIUM 4.3 03/07/2023    CHLORIDE 104 03/07/2023    CO2 22 03/07/2023    GLUCOSE 111 (H) 03/07/2023    BUN 18 03/07/2023    CREATININE 0.77 03/07/2023        Lab Results   Component Value Date    WBC 9.4 03/07/2023    HEMOGLOBIN 13.4 (L) 03/07/2023    HEMATOCRIT 40.0 (L) 03/07/2023    PLATELETCT 200 03/07/2023      EC-ECHOCARDIOGRAM COMPLETE W/ CONT   Final Result      DX-CHEST-PORTABLE (1 VIEW)   Final Result         1.  Left basilar atelectasis, no focal infiltrate      CL-LEFT HEART CATHETERIZATION WITH POSSIBLE INTERVENTION    (Results Pending)     3/6/23 Echocardiogram:  CONCLUSIONS  Mildly reduced left ventricular systolic function. The left ventricular   ejection fraction is visually estimated to be 45%. Mid inferior and   basal inferior wall hypokinesis.  Normal right ventricular size and systolic function.  Normal left atrial size.  Mild tricuspid regurgitation.  No pericardial effusion.         Total time of the discharge process exceeds 35 minutes.

## 2023-03-08 NOTE — ASSESSMENT & PLAN NOTE
Body mass index is 31.67 kg/m².  Encouraged dietary restriction on food amounts and recommend healthy heart diets.

## 2023-03-08 NOTE — PROGRESS NOTES
[facility] Cardiology Follow-up Consult Note  Date of note:    3/8/2023          Patient ID:  Name:   Albert Fu   YOB: 1955  Age:   67 y.o.  male   MRN:   [id number]    Chief Complaint   Patient presents with    Chest Pain     STEMI transfer from [place]       Interim Events:  Patient seen in discharge lounge went over his medications    ROS  No NV, No Bleeding, No dizziness   All other review of systems reviewed and negative.    Past medical, surgical, social, and family history reviewed and unchanged from admission except as noted in assessment and plan.    Medications: Reviewed in MAR  Current Facility-Administered Medications   Medication Dose Frequency Provider Last Rate Last Admin    lisinopril (PRINIVIL) tablet 2.5 mg  2.5 mg Q DAY BONNIE Alejo.P.R.N.   2.5 mg at 03/08/23 0413    metoprolol SR (TOPROL XL) tablet 25 mg  25 mg Nightly BONNIE Alejo.P.R.N.   25 mg at 03/07/23 2016    clopidogrel (PLAVIX) tablet 75 mg  75 mg DAILY Fracisco Olguin M.D.   75 mg at 03/08/23 0414    atorvastatin (LIPITOR) tablet 80 mg  80 mg Q EVENING Fracisco Olguin M.D.   80 mg at 03/07/23 1724    senna-docusate (PERICOLACE or SENOKOT S) 8.6-50 MG per tablet 2 Tablet  2 Tablet BID Fracisco Olguin M.D.   2 Tablet at 03/07/23 1724    And    polyethylene glycol/lytes (MIRALAX) PACKET 1 Packet  1 Packet QDAY PRN Fracisco Olguin M.D.        And    magnesium hydroxide (MILK OF MAGNESIA) suspension 30 mL  30 mL QDAY PRN Fracisco Olguin M.D.        And    bisacodyl (DULCOLAX) suppository 10 mg  10 mg QDAY PRN Fracisco Olguin M.D.        hydrALAZINE (APRESOLINE) injection 10 mg  10 mg Q4HRS PRN Fracisco Olguin M.D.        insulin regular (HumuLIN R,NovoLIN R) injection  1-6 Units Q6HRS Fracisco Olguin M.D.        And    dextrose 10 % BOLUS 25 g  25 g Q15 MIN PRN Fracisco Olguin M.D.        levothyroxine (SYNTHROID) tablet 75 mcg  75 mcg [facility] Fracisco Olguin M.D.   75 mcg at 03/08/23 041  "   thiamine (B-1) IVPB 100 mg in 100 mL D5W (premix)  100 mg DAILY Fracisco Olguin M.D.   Stopped at 03/08/23 0815    acetaminophen (Tylenol) tablet 650 mg  650 mg Q6HRS PRN Messi Del Castillo M.D.        aspirin EC (ECOTRIN) tablet 81 mg  81 mg DAILY Messi Del Castillo M.D.   81 mg at 03/08/23 0413    enoxaparin (Lovenox) inj 40 mg  40 mg DAILY AT 1800 Joe Patton M.D.   40 mg at 03/07/23 1724   Last reviewed on 3/6/2023 11:48 AM by Hallie Jacobson   No Known Allergies    Physical Exam  Body mass index is 33.11 kg/m². /73   Pulse (!) 55   Temp 36.9 °C (98.4 °F) (Temporal)   Resp 18   Ht 1.93 m (6' 4\")   Wt 123 kg (272 lb 0.8 oz)   SpO2 91%    Vitals:    03/07/23 2000 03/07/23 2300 03/08/23 0329 03/08/23 0732   BP:  113/59 107/67 115/73   Pulse:  (!) 45 (!) 56 (!) 55   Resp:  18 18 18   Temp: 36.6 °C (97.9 °F) 36.5 °C (97.7 °F) 36.6 °C (97.9 °F) 36.9 °C (98.4 °F)   TempSrc: Temporal Temporal Temporal Temporal   SpO2:  91% 93% 91%   Weight:  123 kg (272 lb 0.8 oz)     Height:        Oxygen Therapy:  Pulse Oximetry: 91 %, O2 (LPM): 0, O2 Delivery Device: None - Room Air    General: no apparent distress  Eyes: nl conjunctiva  ENT: OP clear  Neck: no JVD   Lungs: normal respiratory effort  Heart: RRR,   EXT no edema bilateral lower extremities.   Abdomen: soft, non tender, non distended,  Neurological: No focal deficits  Psychiatric: Appropriate affect,   Skin: Warm extremities    Labs (personally reviewed and notable for):   Recent Results (from the past 24 hour(s))   POCT glucose device results    Collection Time: 03/07/23 11:14 AM   Result Value Ref Range    POC Glucose, Blood 99 65 - 99 mg/dL   POCT glucose device results    Collection Time: 03/07/23 11:34 PM   Result Value Ref Range    POC Glucose, Blood 90 65 - 99 mg/dL   MAGNESIUM    Collection Time: 03/08/23  3:25 AM   Result Value Ref Range    Magnesium 2.0 1.5 - 2.5 mg/dL   POCT glucose device results    Collection Time: 03/08/23  4:17 " AM   Result Value Ref Range    POC Glucose, Blood 95 65 - 99 mg/dL       Cardiac Imaging and Procedures Review:    EKG and telemetry tracings personally reviewed    Impression and Medical Decision Making:  Principal Problem (Resolved):    STEMI (ST elevation myocardial infarction) (HCC) POA: Yes  Active Problems:    Hypothyroid POA: Unknown    Coronary artery disease due to lipid rich plaque (Chronic) POA: Yes    Obesity (BMI 30.0-34.9) POA: Unknown  Resolved Problems:    High anion gap metabolic acidosis POA: Unknown    V-tach POA: Yes    STEMI  Status post stenting to the culprit RCA  Encouraged to follow-up closely with [place] cardiology for staged PCI to his LAD and circumflex as appropriate  Reinforced importance of medications especially dual antiplatelet therapy      Safe for discharge encouraged him to call me in the interim if he has any questions until he can establish locally with cardiologist    I personally discussed his case with  Dr Luis Wooten D.O.      It is my pleasure to participate in the care of Mr. Fu.  Please do not hesitate to contact me with questions or concerns.    Anthony Ruiz MD PhD [facility]  Cardiologist [facility]    3/8/2023    Please note that this dictation was created using voice recognition software. There may be errors of grammar and possibly content I did not discover before finalizing the note.

## 2023-03-08 NOTE — CARE PLAN
The patient is Watcher - Medium risk of patient condition declining or worsening    Shift Goals monitor heart rhythm  Clinical Goals: hemodynamic stability  Patient Goals: go home  Family Goals: RAYMOND    Progress made toward(s) clinical / shift goals:    Problem: Pain  Goal: Alleviation of Pain or a reduction in pain to the patient's comfort goal  Outcome: Progressing   Q4 pain assessments in place. Pt educated on pain scale. RN aware of pt's goal pain. PRN medication orders followed.     Patient is not progressing towards the following goals:

## 2023-03-08 NOTE — PROGRESS NOTES
Assumed care of pt. Bedside report received from GALDINO Goncalves. Pt was updated on plan of care. Call light, phone and personal belongings in reach. Bed alarm on and working properly, bed in lowest position, and locked.

## 2023-03-08 NOTE — CARE PLAN
The patient is Stable - Low risk of patient condition declining or worsening    Shift Goals  Clinical Goals: discharge planning  Patient Goals: discharge before storm  Family Goals: n/a    Progress made toward(s) clinical / shift goals:    Problem: Knowledge Deficit - Standard  Goal: Patient and family/care givers will demonstrate understanding of plan of care, disease process/condition, diagnostic tests and medications  Outcome: Progressing     Problem: Skin Integrity  Goal: Skin integrity is maintained or improved  Outcome: Progressing     Problem: Acute Care of the Cardiac Cath Patient  Goal: Pre Procedure Optimal Outcome for the Cardiac Cath Patient  Outcome: Progressing  Goal: Post Procedure Optimal Outcome for the Cardiac Cath Patient  Outcome: Progressing     Problem: Pain  Goal: Alleviation of Pain or a reduction in pain to the patient's comfort goal  Outcome: Progressing       Patient is not progressing towards the following goals: